# Patient Record
Sex: FEMALE | Race: OTHER | Employment: UNEMPLOYED | URBAN - METROPOLITAN AREA
[De-identification: names, ages, dates, MRNs, and addresses within clinical notes are randomized per-mention and may not be internally consistent; named-entity substitution may affect disease eponyms.]

---

## 2020-05-13 ENCOUNTER — TELEPHONE (OUTPATIENT)
Dept: OBGYN CLINIC | Facility: CLINIC | Age: 30
End: 2020-05-13

## 2020-05-13 ENCOUNTER — TELEMEDICINE (OUTPATIENT)
Dept: FAMILY MEDICINE CLINIC | Facility: CLINIC | Age: 30
End: 2020-05-13

## 2020-05-13 DIAGNOSIS — Z32.01 PREGNANCY TEST POSITIVE: Primary | ICD-10-CM

## 2020-05-13 PROCEDURE — 99203 OFFICE O/P NEW LOW 30 MIN: CPT | Performed by: FAMILY MEDICINE

## 2020-05-13 NOTE — TELEPHONE ENCOUNTER
Patient calling to initiate prenatal care  LMP 4/13/20   Patient is 7-8 weeks Confirmation Ultrasound and Appointment scheduled on 6 /10 20  [facility]   Good time to return phone call  [phone]    This will be the 3rd child.  So far

## 2020-05-13 NOTE — H&P
[facility], [address] [place] [address]    History and Physical    [facility] [place] Status:  No patient class for patient encounter    3/22/1990 MRN [id number]   Location [facility], [facility] , [facility] Attending No att.  pr taken for this visit. Constitutional: No distress. She is alert, coherent and comfortable over the phone and was able to speak in full sentences with ease.        Results:   No results found for: WBC, HGB, HCT, PLT, CREATSERUM, BUN, NA, K, CL, CO2, GL

## 2020-05-13 NOTE — TELEPHONE ENCOUNTER
Future Appointments   Date Time Provider Michele Gr   7/8/2020  9:30 AM Zane Moreno MD [facility] [address]     PT was [facility] due to [facility] INS  New OB appt scheduled  LMP 4/13/20

## 2020-05-13 NOTE — TELEPHONE ENCOUNTER
[facility] community will not cover confirmation US if pt is sure of lmp. Please reschedule for new ob and route to RN.

## 2020-05-13 NOTE — TELEPHONE ENCOUNTER
Meds: PNV  PMH: None  PSH: C/S x 2  Complications?: None  Patient states her menses are regular. Denies any bleeding or pain at this time. Advised patient to keep appointment as scheduled. SAB precautions given.

## 2020-05-13 NOTE — PATIENT INSTRUCTIONS
Thank you for choosing Vargas Tam MD at [place] [address]  To Do: Stephanie Preston  1. Please take prenatal vitamins  [facility] is located in [address]. Monday, Tuesday & Friday – 8 a.m. to 4 p.m. Wednesday, Thursday – 7 a.m. to 3 p.m. those potential risks and we strive to make you healthier and to improve your quality of life.     Referrals, and Radiology Information:    If your insurance requires a referral to a specialist, please allow 5 business days to process your referral request.

## 2020-06-24 ENCOUNTER — TELEMEDICINE (OUTPATIENT)
Dept: FAMILY MEDICINE CLINIC | Facility: CLINIC | Age: 30
End: 2020-06-24

## 2020-06-24 ENCOUNTER — E-VISIT (OUTPATIENT)
Dept: FAMILY MEDICINE CLINIC | Facility: CLINIC | Age: 30
End: 2020-06-24

## 2020-06-24 DIAGNOSIS — Z02.9 ENCOUNTERS FOR ADMINISTRATIVE PURPOSE: Primary | ICD-10-CM

## 2020-06-24 DIAGNOSIS — N30.00 ACUTE CYSTITIS WITHOUT HEMATURIA: Primary | ICD-10-CM

## 2020-06-24 PROCEDURE — 99213 OFFICE O/P EST LOW 20 MIN: CPT | Performed by: FAMILY MEDICINE

## 2020-06-24 RX ORDER — CEPHALEXIN 500 MG/1
500 CAPSULE ORAL 3 TIMES DAILY
Qty: 21 CAPSULE | Refills: 0 | Status: SHIPPED | OUTPATIENT
Start: 2020-06-24 | End: 2020-07-01

## 2020-06-24 NOTE — PROGRESS NOTES
PT created e-visit today but has telemedicine visit with PCP today at 11AM. Pt created e-visit in error. Requested cancellation. No charge to patient.      Spoke to patient on phone, Will keep appointment with PCP as they may want further laboratory testing

## 2020-06-24 NOTE — PATIENT INSTRUCTIONS
Thank you for choosing Marisol Bowen MD at [place] [address]  To Do: Stephanie Preston  1. Please take meds as directed. [facility] Bea[facility] is located in [address]. Monday, Tuesday & Friday – 8 a.m. to 4 p.m. Wednesday, Thursday – 7 a.m. to 3 p.m. those potential risks and we strive to make you healthier and to improve your quality of life.     Referrals, and Radiology Information:    If your insurance requires a referral to a specialist, please allow 5 business days to process your referral request.

## 2020-06-24 NOTE — PROGRESS NOTES
HPI:    Patient ID: Deysi Garcia is a 31year old female. HPI  Ms. Tito Emanuel is a pleasant 32 y/o F who has been generally healthy and is currently pregnant with her 3rd child at 1 mos AOG presenting for a video visit.  She has been having urinary frequ mouth 3 (three) times daily for 7 days.        Imaging & Referrals:  None       TY#[id number]

## 2020-07-23 ENCOUNTER — INITIAL PRENATAL (OUTPATIENT)
Dept: OBGYN CLINIC | Facility: CLINIC | Age: 30
End: 2020-07-23
Payer: MEDICAID

## 2020-07-23 VITALS — DIASTOLIC BLOOD PRESSURE: 70 MMHG | WEIGHT: 227.38 LBS | SYSTOLIC BLOOD PRESSURE: 116 MMHG

## 2020-07-23 DIAGNOSIS — Z36.9 PRENATAL SCREENING ENCOUNTER: Primary | ICD-10-CM

## 2020-07-23 DIAGNOSIS — Z34.81 ENCOUNTER FOR SUPERVISION OF OTHER NORMAL PREGNANCY IN FIRST TRIMESTER: ICD-10-CM

## 2020-07-23 LAB — MULTISTIX LOT#: NORMAL NUMERIC

## 2020-07-23 PROCEDURE — 87086 URINE CULTURE/COLONY COUNT: CPT | Performed by: NURSE PRACTITIONER

## 2020-07-23 PROCEDURE — 3078F DIAST BP <80 MM HG: CPT | Performed by: NURSE PRACTITIONER

## 2020-07-23 PROCEDURE — 81002 URINALYSIS NONAUTO W/O SCOPE: CPT | Performed by: NURSE PRACTITIONER

## 2020-07-23 PROCEDURE — 3074F SYST BP LT 130 MM HG: CPT | Performed by: NURSE PRACTITIONER

## 2020-07-23 PROCEDURE — 0500F INITIAL PRENATAL CARE VISIT: CPT | Performed by: NURSE PRACTITIONER

## 2020-07-23 NOTE — PROGRESS NOTES
Here for initial prenatal visit with our group. 31year old  at 14w3d by LMP. Patient's last menstrual period was 2020 (exact date). Last pap smear was in 2018 and it was normal. She will obtain the records.      Her first pregnancy result

## 2020-08-07 ENCOUNTER — APPOINTMENT (OUTPATIENT)
Dept: LAB | Age: 30
End: 2020-08-07
Attending: NURSE PRACTITIONER
Payer: MEDICAID

## 2020-08-07 DIAGNOSIS — Z34.81 PRENATAL CARE, SUBSEQUENT PREGNANCY, FIRST TRIMESTER: Primary | ICD-10-CM

## 2020-08-07 DIAGNOSIS — Z34.81 ENCOUNTER FOR SUPERVISION OF OTHER NORMAL PREGNANCY IN FIRST TRIMESTER: ICD-10-CM

## 2020-08-07 LAB
ANTIBODY SCREEN: NEGATIVE
BASOPHILS # BLD AUTO: 0.01 X10(3) UL (ref 0–0.2)
BASOPHILS NFR BLD AUTO: 0.1 %
DEPRECATED RDW RBC AUTO: 41 FL (ref 35.1–46.3)
EOSINOPHIL # BLD AUTO: 0.08 X10(3) UL (ref 0–0.7)
EOSINOPHIL NFR BLD AUTO: 0.9 %
ERYTHROCYTE [DISTWIDTH] IN BLOOD BY AUTOMATED COUNT: 12.6 % (ref 11–15)
HBV SURFACE AG SER-ACNC: <0.1 [IU]/L
HBV SURFACE AG SERPL QL IA: NONREACTIVE
HCT VFR BLD AUTO: 36.6 % (ref 35–48)
HGB BLD-MCNC: 12.4 G/DL (ref 12–16)
IMM GRANULOCYTES # BLD AUTO: 0.02 X10(3) UL (ref 0–1)
IMM GRANULOCYTES NFR BLD: 0.2 %
LYMPHOCYTES # BLD AUTO: 1.93 X10(3) UL (ref 1–4)
LYMPHOCYTES NFR BLD AUTO: 21.2 %
MCH RBC QN AUTO: 30 PG (ref 26–34)
MCHC RBC AUTO-ENTMCNC: 33.9 G/DL (ref 31–37)
MCV RBC AUTO: 88.6 FL (ref 80–100)
MONOCYTES # BLD AUTO: 0.29 X10(3) UL (ref 0.1–1)
MONOCYTES NFR BLD AUTO: 3.2 %
NEUTROPHILS # BLD AUTO: 6.77 X10 (3) UL (ref 1.5–7.7)
NEUTROPHILS # BLD AUTO: 6.77 X10(3) UL (ref 1.5–7.7)
NEUTROPHILS NFR BLD AUTO: 74.4 %
PLATELET # BLD AUTO: 200 10(3)UL (ref 150–450)
RBC # BLD AUTO: 4.13 X10(6)UL (ref 3.8–5.3)
RH BLOOD TYPE: POSITIVE
RUBV IGG SER QL: POSITIVE
RUBV IGG SER-ACNC: >500 IU/ML (ref 10–?)
T PALLIDUM AB SER QL IA: NONREACTIVE
WBC # BLD AUTO: 9.1 X10(3) UL (ref 4–11)

## 2020-08-07 PROCEDURE — 86780 TREPONEMA PALLIDUM: CPT

## 2020-08-07 PROCEDURE — 85025 COMPLETE CBC W/AUTO DIFF WBC: CPT

## 2020-08-07 PROCEDURE — 86901 BLOOD TYPING SEROLOGIC RH(D): CPT

## 2020-08-07 PROCEDURE — 86900 BLOOD TYPING SEROLOGIC ABO: CPT

## 2020-08-07 PROCEDURE — 87340 HEPATITIS B SURFACE AG IA: CPT

## 2020-08-07 PROCEDURE — 36415 COLL VENOUS BLD VENIPUNCTURE: CPT

## 2020-08-07 PROCEDURE — 87389 HIV-1 AG W/HIV-1&-2 AB AG IA: CPT

## 2020-08-07 PROCEDURE — 86850 RBC ANTIBODY SCREEN: CPT

## 2020-08-07 PROCEDURE — 86762 RUBELLA ANTIBODY: CPT

## 2020-08-10 ENCOUNTER — TELEPHONE (OUTPATIENT)
Dept: OBGYN CLINIC | Facility: CLINIC | Age: 30
End: 2020-08-10

## 2020-08-10 NOTE — TELEPHONE ENCOUNTER
Normal MaterniT 21 results, FYI male fetus noted. Results will be given to RN in office to notify patient.

## 2020-08-11 NOTE — TELEPHONE ENCOUNTER
Patient returned call. Verified correct name and .  Reported results including sex of the baby per pt request.

## 2020-08-21 ENCOUNTER — ROUTINE PRENATAL (OUTPATIENT)
Dept: OBGYN CLINIC | Facility: CLINIC | Age: 30
End: 2020-08-21
Payer: MEDICAID

## 2020-08-21 VITALS — SYSTOLIC BLOOD PRESSURE: 114 MMHG | DIASTOLIC BLOOD PRESSURE: 72 MMHG | WEIGHT: 230 LBS

## 2020-08-21 DIAGNOSIS — Z34.81 ENCOUNTER FOR SUPERVISION OF OTHER NORMAL PREGNANCY IN FIRST TRIMESTER: ICD-10-CM

## 2020-08-21 DIAGNOSIS — Z36.9 PRENATAL SCREENING ENCOUNTER: Primary | ICD-10-CM

## 2020-08-21 LAB — MULTISTIX LOT#: NORMAL NUMERIC

## 2020-08-21 PROCEDURE — 0502F SUBSEQUENT PRENATAL CARE: CPT | Performed by: OBSTETRICS & GYNECOLOGY

## 2020-08-21 PROCEDURE — 81002 URINALYSIS NONAUTO W/O SCOPE: CPT | Performed by: OBSTETRICS & GYNECOLOGY

## 2020-08-21 PROCEDURE — 3078F DIAST BP <80 MM HG: CPT | Performed by: OBSTETRICS & GYNECOLOGY

## 2020-08-21 PROCEDURE — 3074F SYST BP LT 130 MM HG: CPT | Performed by: OBSTETRICS & GYNECOLOGY

## 2020-09-14 ENCOUNTER — MED REC SCAN ONLY (OUTPATIENT)
Dept: OBGYN CLINIC | Facility: CLINIC | Age: 30
End: 2020-09-14

## 2020-09-14 PROBLEM — Z98.891 PREVIOUS CESAREAN SECTION: Status: ACTIVE | Noted: 2020-09-14

## 2020-09-15 ENCOUNTER — ROUTINE PRENATAL (OUTPATIENT)
Dept: OBGYN CLINIC | Facility: CLINIC | Age: 30
End: 2020-09-15
Payer: MEDICAID

## 2020-09-15 ENCOUNTER — ULTRASOUND ENCOUNTER (OUTPATIENT)
Dept: OBGYN CLINIC | Facility: CLINIC | Age: 30
End: 2020-09-15
Payer: MEDICAID

## 2020-09-15 VITALS — WEIGHT: 232 LBS | SYSTOLIC BLOOD PRESSURE: 118 MMHG | DIASTOLIC BLOOD PRESSURE: 76 MMHG

## 2020-09-15 DIAGNOSIS — Z36.9 PRENATAL SCREENING ENCOUNTER: Primary | ICD-10-CM

## 2020-09-15 DIAGNOSIS — Z36.89 SCREENING, ANTENATAL, FOR FETAL ANATOMIC SURVEY: ICD-10-CM

## 2020-09-15 PROBLEM — Z3A.20 20 WEEKS GESTATION OF PREGNANCY (HCC): Status: ACTIVE | Noted: 2020-09-15

## 2020-09-15 PROBLEM — Z3A.20 20 WEEKS GESTATION OF PREGNANCY: Status: ACTIVE | Noted: 2020-09-15

## 2020-09-15 LAB
GLUCOSE (URINE DIPSTICK): NEGATIVE MG/DL
MULTISTIX LOT#: NORMAL NUMERIC

## 2020-09-15 PROCEDURE — 3074F SYST BP LT 130 MM HG: CPT | Performed by: OBSTETRICS & GYNECOLOGY

## 2020-09-15 PROCEDURE — 76805 OB US >/= 14 WKS SNGL FETUS: CPT | Performed by: OBSTETRICS & GYNECOLOGY

## 2020-09-15 PROCEDURE — 0502F SUBSEQUENT PRENATAL CARE: CPT | Performed by: OBSTETRICS & GYNECOLOGY

## 2020-09-15 PROCEDURE — 3078F DIAST BP <80 MM HG: CPT | Performed by: OBSTETRICS & GYNECOLOGY

## 2020-09-15 PROCEDURE — 81002 URINALYSIS NONAUTO W/O SCOPE: CPT | Performed by: OBSTETRICS & GYNECOLOGY

## 2020-09-15 NOTE — PROGRESS NOTES
Patient presents with:  Pregnancy: KISHAN after US     Routine prenatal visit. Patient without complaints. Patient denies any bleeding, leaking fluid, cramping, or contractions. Good fetal movement.     Assessment/Plan:  22w1d doing well  Ultrasound EDC

## 2020-09-16 ENCOUNTER — TELEPHONE (OUTPATIENT)
Dept: OBGYN CLINIC | Facility: CLINIC | Age: 30
End: 2020-09-16

## 2020-10-16 ENCOUNTER — ROUTINE PRENATAL (OUTPATIENT)
Dept: OBGYN CLINIC | Facility: CLINIC | Age: 30
End: 2020-10-16
Payer: MEDICAID

## 2020-10-16 VITALS
DIASTOLIC BLOOD PRESSURE: 62 MMHG | HEIGHT: 65 IN | WEIGHT: 233.38 LBS | SYSTOLIC BLOOD PRESSURE: 102 MMHG | BODY MASS INDEX: 38.88 KG/M2

## 2020-10-16 DIAGNOSIS — Z98.891 PREVIOUS CESAREAN SECTION: ICD-10-CM

## 2020-10-16 DIAGNOSIS — Z36.9 PRENATAL SCREENING ENCOUNTER: Primary | ICD-10-CM

## 2020-10-16 DIAGNOSIS — Z34.81 ENCOUNTER FOR SUPERVISION OF OTHER NORMAL PREGNANCY IN FIRST TRIMESTER: ICD-10-CM

## 2020-10-16 PROCEDURE — 90715 TDAP VACCINE 7 YRS/> IM: CPT | Performed by: OBSTETRICS & GYNECOLOGY

## 2020-10-16 PROCEDURE — 3074F SYST BP LT 130 MM HG: CPT | Performed by: OBSTETRICS & GYNECOLOGY

## 2020-10-16 PROCEDURE — 81002 URINALYSIS NONAUTO W/O SCOPE: CPT | Performed by: OBSTETRICS & GYNECOLOGY

## 2020-10-16 PROCEDURE — 90471 IMMUNIZATION ADMIN: CPT | Performed by: OBSTETRICS & GYNECOLOGY

## 2020-10-16 PROCEDURE — 3008F BODY MASS INDEX DOCD: CPT | Performed by: OBSTETRICS & GYNECOLOGY

## 2020-10-16 PROCEDURE — 3078F DIAST BP <80 MM HG: CPT | Performed by: OBSTETRICS & GYNECOLOGY

## 2020-10-16 PROCEDURE — 0502F SUBSEQUENT PRENATAL CARE: CPT | Performed by: OBSTETRICS & GYNECOLOGY

## 2020-10-16 NOTE — PROGRESS NOTES
KISHAN 28w5d    Doing well, +FM  No contractions  No LOF, VB  Forgot about her 1 hour    1. FHT-present  2. PNL:  Stressed importance of 1 hour/CBC prior to next visit. HIV order added and discussed  3.  Mode of delivery: RCS needed at 39 weeks, message sent t

## 2020-10-16 NOTE — PATIENT INSTRUCTIONS
Please call [phone] to set up an appointment for your laboratory draw. Tdap Vaccine: What You Need To Know    1. Why Get Vaccinated:    · Tetanus, diphtheria, and pertussis can be very serious diseases, even for adolescents and adults.   Tdap vaccine gotten a dose. Tdap may also be given after a severe cut or burn to prevent tetanus infection.

## 2020-10-20 ENCOUNTER — TELEPHONE (OUTPATIENT)
Dept: OBGYN CLINIC | Facility: CLINIC | Age: 30
End: 2020-10-20

## 2020-10-20 NOTE — TELEPHONE ENCOUNTER
----- Message from Kenn Le MD sent at 10/16/2020  3:06 PM CDT -----  Regarding: needs RCS scheudled  Needs RCS scheduled for 39 weeks  HENRY is 1/3/21  Schedule per call schedule  Let pt  know date/time when done, thank you! Thank you!

## 2020-10-21 ENCOUNTER — TELEPHONE (OUTPATIENT)
Dept: OBGYN CLINIC | Facility: CLINIC | Age: 30
End: 2020-10-21

## 2020-10-21 NOTE — TELEPHONE ENCOUNTER
Patient is overdue for 1 hour gtt. Next appt is scheduled for 10/30/20 with Dr. Sherie Sandhu for kailyn. No pending lab appts. Call to patient; no answer. Left message to call back.

## 2020-10-22 NOTE — TELEPHONE ENCOUNTER
Spoke with patient; she was scheduled for tomorrow but had to cancel due to childcare issues. Pt will reschedule for next week.

## 2020-10-27 ENCOUNTER — LAB ENCOUNTER (OUTPATIENT)
Dept: LAB | Age: 30
End: 2020-10-27
Attending: OBSTETRICS & GYNECOLOGY
Payer: MEDICAID

## 2020-10-27 DIAGNOSIS — Z36.9 PRENATAL SCREENING ENCOUNTER: ICD-10-CM

## 2020-10-27 DIAGNOSIS — Z34.81 ENCOUNTER FOR SUPERVISION OF OTHER NORMAL PREGNANCY IN FIRST TRIMESTER: ICD-10-CM

## 2020-10-27 PROCEDURE — 87389 HIV-1 AG W/HIV-1&-2 AB AG IA: CPT

## 2020-10-27 PROCEDURE — 85025 COMPLETE CBC W/AUTO DIFF WBC: CPT

## 2020-10-27 PROCEDURE — 82950 GLUCOSE TEST: CPT

## 2020-10-27 PROCEDURE — 36415 COLL VENOUS BLD VENIPUNCTURE: CPT

## 2020-10-27 PROCEDURE — 86780 TREPONEMA PALLIDUM: CPT

## 2020-10-30 ENCOUNTER — ROUTINE PRENATAL (OUTPATIENT)
Dept: OBGYN CLINIC | Facility: CLINIC | Age: 30
End: 2020-10-30
Payer: MEDICAID

## 2020-10-30 VITALS — DIASTOLIC BLOOD PRESSURE: 72 MMHG | WEIGHT: 232 LBS | SYSTOLIC BLOOD PRESSURE: 114 MMHG | BODY MASS INDEX: 39 KG/M2

## 2020-10-30 DIAGNOSIS — Z36.9 PRENATAL SCREENING ENCOUNTER: Primary | ICD-10-CM

## 2020-10-30 PROCEDURE — 3074F SYST BP LT 130 MM HG: CPT | Performed by: OBSTETRICS & GYNECOLOGY

## 2020-10-30 PROCEDURE — 0502F SUBSEQUENT PRENATAL CARE: CPT | Performed by: OBSTETRICS & GYNECOLOGY

## 2020-10-30 PROCEDURE — 81002 URINALYSIS NONAUTO W/O SCOPE: CPT | Performed by: OBSTETRICS & GYNECOLOGY

## 2020-10-30 PROCEDURE — 3078F DIAST BP <80 MM HG: CPT | Performed by: OBSTETRICS & GYNECOLOGY

## 2020-10-30 NOTE — PROGRESS NOTES
KISHAN  [id number]  GA: 30w5d   10/30/20  1451   BP: 114/72   Weight: 232 lb (105.2 kg)       Doing well, +FM  Denies LOF/VB/uctx  Rh positive, TDAP received, EPDS - will need at next visit   Fetal movement count given  Hx of CS x 2, ARLEEN scheudled 12/29/2020.  D/

## 2020-11-13 ENCOUNTER — ROUTINE PRENATAL (OUTPATIENT)
Dept: OBGYN CLINIC | Facility: CLINIC | Age: 30
End: 2020-11-13
Payer: MEDICAID

## 2020-11-13 VITALS — HEIGHT: 65 IN | WEIGHT: 235.38 LBS | BODY MASS INDEX: 39.22 KG/M2

## 2020-11-13 DIAGNOSIS — Z98.891 PREVIOUS CESAREAN SECTION: Primary | ICD-10-CM

## 2020-11-13 DIAGNOSIS — Z3A.20 20 WEEKS GESTATION OF PREGNANCY: ICD-10-CM

## 2020-11-13 DIAGNOSIS — Z36.9 PRENATAL SCREENING ENCOUNTER: ICD-10-CM

## 2020-11-13 PROCEDURE — 0502F SUBSEQUENT PRENATAL CARE: CPT | Performed by: OBSTETRICS & GYNECOLOGY

## 2020-11-13 PROCEDURE — 81002 URINALYSIS NONAUTO W/O SCOPE: CPT | Performed by: OBSTETRICS & GYNECOLOGY

## 2020-11-13 PROCEDURE — 3008F BODY MASS INDEX DOCD: CPT | Performed by: OBSTETRICS & GYNECOLOGY

## 2020-11-13 NOTE — PROGRESS NOTES
KISHAN - 32w5d  Doing well, +FM  Denies LOF/VB/uctx  Ht 65\"   Wt 235 lb 6.4 oz (106.8 kg)   LMP 04/13/2020 (Exact Date)   BMI 39.17 kg/m²   Rh positive, TDAP received  RTC in 2 wks  rCS with BS scheduled for 39 wks   PTL and Fetal movement instructions revie

## 2020-11-19 DIAGNOSIS — Z34.90 PREGNANCY: Primary | ICD-10-CM

## 2020-12-04 ENCOUNTER — ROUTINE PRENATAL (OUTPATIENT)
Dept: OBGYN CLINIC | Facility: CLINIC | Age: 30
End: 2020-12-04
Payer: MEDICAID

## 2020-12-04 VITALS — SYSTOLIC BLOOD PRESSURE: 110 MMHG | DIASTOLIC BLOOD PRESSURE: 64 MMHG | WEIGHT: 235.63 LBS | BODY MASS INDEX: 39 KG/M2

## 2020-12-04 DIAGNOSIS — Z36.9 PRENATAL SCREENING ENCOUNTER: ICD-10-CM

## 2020-12-04 DIAGNOSIS — Z3A.20 20 WEEKS GESTATION OF PREGNANCY: Primary | ICD-10-CM

## 2020-12-04 DIAGNOSIS — Z98.891 PREVIOUS CESAREAN SECTION: ICD-10-CM

## 2020-12-04 PROCEDURE — 87081 CULTURE SCREEN ONLY: CPT | Performed by: OBSTETRICS & GYNECOLOGY

## 2020-12-04 PROCEDURE — 81002 URINALYSIS NONAUTO W/O SCOPE: CPT | Performed by: OBSTETRICS & GYNECOLOGY

## 2020-12-04 PROCEDURE — 87653 STREP B DNA AMP PROBE: CPT | Performed by: OBSTETRICS & GYNECOLOGY

## 2020-12-04 PROCEDURE — 0502F SUBSEQUENT PRENATAL CARE: CPT | Performed by: OBSTETRICS & GYNECOLOGY

## 2020-12-04 PROCEDURE — 3078F DIAST BP <80 MM HG: CPT | Performed by: OBSTETRICS & GYNECOLOGY

## 2020-12-04 PROCEDURE — 3074F SYST BP LT 130 MM HG: CPT | Performed by: OBSTETRICS & GYNECOLOGY

## 2020-12-04 NOTE — PATIENT INSTRUCTIONS
Labor Instructions    How do I know if it’s true labor? • One of the most important aspects of any pregnancy is being able to recognize the onset of labor.   Unfortunately, on occasion it can be difficult or confusing, especially if you have had one or m timing of the contractions. Having regular (usually closer), longer lasting (35-70 seconds), and sharper (more painful) contractions are the common symptoms of actual labor.   The second way in which labor can begin which occurs in approximately 30% of all allowed in the room during your labor. During the delivery, the nurses will inform you of the hospital policy and how many coaches are allowed. You may desire pain medication or anesthesia for pain.   You probably discussed some aspects of pain medication excessively. Please call the office within a few days after you are discharged from the hospital to schedule your post-partum visit, which is usually 4-6 weeks after delivery. Any medications necessary will be discussed on an individual basis.   If you Time M T W Th F S S                                                                                                           Time M T W Th F S S

## 2020-12-04 NOTE — PROGRESS NOTES
KISHAN  [id number]  GA: 35w5d   12/04/20  1445   BP: 110/64   Weight: 235 lb 9.6 oz (106.9 kg)       Doing well, +FM  Denies LOF/VB/uctx  Mode of delivery: RCS anticipated  SVE 0/0/-4   GBS collected  Fetal movement count given  Labor precautions provided   Hx o

## 2020-12-11 ENCOUNTER — ROUTINE PRENATAL (OUTPATIENT)
Dept: OBGYN CLINIC | Facility: CLINIC | Age: 30
End: 2020-12-11
Payer: MEDICAID

## 2020-12-11 VITALS
DIASTOLIC BLOOD PRESSURE: 68 MMHG | WEIGHT: 235.81 LBS | HEIGHT: 65 IN | HEART RATE: 88 BPM | BODY MASS INDEX: 39.29 KG/M2 | SYSTOLIC BLOOD PRESSURE: 118 MMHG

## 2020-12-11 DIAGNOSIS — Z98.891 PREVIOUS CESAREAN SECTION: ICD-10-CM

## 2020-12-11 DIAGNOSIS — Z36.9 PRENATAL SCREENING ENCOUNTER: Primary | ICD-10-CM

## 2020-12-11 DIAGNOSIS — Z34.83 ENCOUNTER FOR SUPERVISION OF OTHER NORMAL PREGNANCY IN THIRD TRIMESTER: ICD-10-CM

## 2020-12-11 PROCEDURE — 3008F BODY MASS INDEX DOCD: CPT | Performed by: NURSE PRACTITIONER

## 2020-12-11 PROCEDURE — 3074F SYST BP LT 130 MM HG: CPT | Performed by: NURSE PRACTITIONER

## 2020-12-11 PROCEDURE — 0502F SUBSEQUENT PRENATAL CARE: CPT | Performed by: NURSE PRACTITIONER

## 2020-12-11 PROCEDURE — 81002 URINALYSIS NONAUTO W/O SCOPE: CPT | Performed by: NURSE PRACTITIONER

## 2020-12-11 PROCEDURE — 3078F DIAST BP <80 MM HG: CPT | Performed by: NURSE PRACTITIONER

## 2020-12-11 NOTE — PROGRESS NOTES
KISHAN  Doing well, +FM  Denies VB/LOF/uctx  Mode of delivery: RCS and BS scheduled  Labor precautions discussed  GBS positive  RTC 1 week

## 2020-12-16 ENCOUNTER — ROUTINE PRENATAL (OUTPATIENT)
Dept: OBGYN CLINIC | Facility: CLINIC | Age: 30
End: 2020-12-16
Payer: MEDICAID

## 2020-12-16 VITALS — WEIGHT: 235.81 LBS | BODY MASS INDEX: 39 KG/M2 | SYSTOLIC BLOOD PRESSURE: 116 MMHG | DIASTOLIC BLOOD PRESSURE: 78 MMHG

## 2020-12-16 DIAGNOSIS — Z3A.20 20 WEEKS GESTATION OF PREGNANCY: ICD-10-CM

## 2020-12-16 DIAGNOSIS — Z98.891 PREVIOUS CESAREAN SECTION: ICD-10-CM

## 2020-12-16 DIAGNOSIS — Z34.00 SUPERVISION OF NORMAL FIRST PREGNANCY, ANTEPARTUM: Primary | ICD-10-CM

## 2020-12-16 PROCEDURE — 0502F SUBSEQUENT PRENATAL CARE: CPT | Performed by: OBSTETRICS & GYNECOLOGY

## 2020-12-16 PROCEDURE — 3074F SYST BP LT 130 MM HG: CPT | Performed by: OBSTETRICS & GYNECOLOGY

## 2020-12-16 PROCEDURE — 81002 URINALYSIS NONAUTO W/O SCOPE: CPT | Performed by: OBSTETRICS & GYNECOLOGY

## 2020-12-16 PROCEDURE — 3078F DIAST BP <80 MM HG: CPT | Performed by: OBSTETRICS & GYNECOLOGY

## 2020-12-16 NOTE — PATIENT INSTRUCTIONS
FETAL MOVEMENT CHART    Begin counting fetal movements at 32 weeks of pregnancy. You may find that your baby is more active after eating or drinking. We want you to time how long it takes to feel 10 movements (kicks, flutters, swishes or rolls).   Ricki Leo

## 2020-12-16 NOTE — PROGRESS NOTES
KISHAN 37w3d    Doing well, +FM  No contractions  No LOF, VB  Feeling pelvic pressure    1. FHT-present  2. PNL:  GBS positive  3. Mode of delivery: for RCS at 39 weeks, scheduled, with tubal ligation. IDPA form has been signed. Given ERAS handout today  4.  I

## 2020-12-21 ENCOUNTER — TELEPHONE (OUTPATIENT)
Dept: OBGYN CLINIC | Facility: CLINIC | Age: 30
End: 2020-12-21

## 2020-12-21 NOTE — TELEPHONE ENCOUNTER
Pt requesting order for breast pump. I advised pt in another call to set up acct with Eric Long (she is medicaid) but [facility] will not set up acct w/o an order from doctor.

## 2020-12-23 ENCOUNTER — TELEPHONE (OUTPATIENT)
Dept: OBGYN UNIT | Facility: HOSPITAL | Age: 30
End: 2020-12-23

## 2020-12-23 ENCOUNTER — ROUTINE PRENATAL (OUTPATIENT)
Dept: OBGYN CLINIC | Facility: CLINIC | Age: 30
End: 2020-12-23
Payer: MEDICAID

## 2020-12-23 VITALS
DIASTOLIC BLOOD PRESSURE: 68 MMHG | BODY MASS INDEX: 39.42 KG/M2 | WEIGHT: 236.63 LBS | HEIGHT: 65 IN | SYSTOLIC BLOOD PRESSURE: 114 MMHG

## 2020-12-23 DIAGNOSIS — Z98.891 PREVIOUS CESAREAN SECTION: ICD-10-CM

## 2020-12-23 DIAGNOSIS — O99.820 GBS (GROUP B STREPTOCOCCUS CARRIER), +RV CULTURE, CURRENTLY PREGNANT: ICD-10-CM

## 2020-12-23 DIAGNOSIS — Z34.80 SUPERVISION OF OTHER NORMAL PREGNANCY: Primary | ICD-10-CM

## 2020-12-23 PROCEDURE — 0502F SUBSEQUENT PRENATAL CARE: CPT | Performed by: OBSTETRICS & GYNECOLOGY

## 2020-12-23 PROCEDURE — 3078F DIAST BP <80 MM HG: CPT | Performed by: OBSTETRICS & GYNECOLOGY

## 2020-12-23 PROCEDURE — 3008F BODY MASS INDEX DOCD: CPT | Performed by: OBSTETRICS & GYNECOLOGY

## 2020-12-23 PROCEDURE — 3074F SYST BP LT 130 MM HG: CPT | Performed by: OBSTETRICS & GYNECOLOGY

## 2020-12-23 PROCEDURE — 81002 URINALYSIS NONAUTO W/O SCOPE: CPT | Performed by: OBSTETRICS & GYNECOLOGY

## 2020-12-23 NOTE — PATIENT INSTRUCTIONS
Labor Instructions    How do I know if it’s true labor? • One of the most important aspects of any pregnancy is being able to recognize the onset of labor.   Unfortunately, on occasion it can be difficult or confusing, especially if you have had one or mor of the contractions. Having regular (usually closer), longer lasting (35-70 seconds), and sharper (more painful) contractions are the common symptoms of actual labor.   The second way in which labor can begin which occurs in approximately 30% of all patien the room during your labor. During the delivery, the nurses will inform you of the hospital policy and how many coaches are allowed. You may desire pain medication or anesthesia for pain.   You probably discussed some aspects of pain medication with us dur Please call the office within a few days after you are discharged from the hospital to schedule your post-partum visit, which is usually 4-6 weeks after delivery. Any medications necessary will be discussed on an individual basis.   If you decide to maurice Time M T W Th F S S                                                                                                           Time M T W Th F S S

## 2020-12-23 NOTE — PROGRESS NOTES
KISHAN  [id number]  GA: 38w3d  There were no vitals filed for this visit.     Doing well, +FM   Denies LOF/VB/uctx  Mode of delivery: ARLEEN anticipated  SVE 0/0/-3   GBS positive  Fetal movement count given  Labor precautions provided   Hx of CS x 2, RCS w/ BS sche

## 2020-12-26 ENCOUNTER — LAB ENCOUNTER (OUTPATIENT)
Dept: LAB | Age: 30
End: 2020-12-26
Attending: OBSTETRICS & GYNECOLOGY
Payer: MEDICAID

## 2020-12-26 DIAGNOSIS — Z34.90 PREGNANCY: ICD-10-CM

## 2020-12-28 ENCOUNTER — TELEPHONE (OUTPATIENT)
Dept: OBGYN UNIT | Facility: HOSPITAL | Age: 30
End: 2020-12-28

## 2020-12-29 ENCOUNTER — HOSPITAL ENCOUNTER (INPATIENT)
Facility: HOSPITAL | Age: 30
LOS: 2 days | Discharge: HOME | End: 2020-12-31
Attending: OBSTETRICS & GYNECOLOGY | Admitting: OBSTETRICS & GYNECOLOGY
Payer: MEDICAID

## 2020-12-29 ENCOUNTER — ANESTHESIA (OUTPATIENT)
Dept: OBGYN UNIT | Facility: HOSPITAL | Age: 30
End: 2020-12-29
Payer: MEDICAID

## 2020-12-29 ENCOUNTER — ANESTHESIA EVENT (OUTPATIENT)
Dept: OBGYN UNIT | Facility: HOSPITAL | Age: 30
End: 2020-12-29
Payer: MEDICAID

## 2020-12-29 PROBLEM — Z34.90 PREGNANCY (HCC): Status: ACTIVE | Noted: 2020-12-29

## 2020-12-29 PROBLEM — Z34.90 PREGNANCY: Status: ACTIVE | Noted: 2020-12-29

## 2020-12-29 PROBLEM — Z34.80 SUPERVISION OF OTHER NORMAL PREGNANCY: Status: RESOLVED | Noted: 2020-12-23 | Resolved: 2020-12-29

## 2020-12-29 LAB
ANTIBODY SCREEN: NEGATIVE
BASOPHILS # BLD AUTO: 0.01 X10(3) UL (ref 0–0.2)
BASOPHILS NFR BLD AUTO: 0.1 %
DEPRECATED RDW RBC AUTO: 38.7 FL (ref 35.1–46.3)
EOSINOPHIL # BLD AUTO: 0.02 X10(3) UL (ref 0–0.7)
EOSINOPHIL NFR BLD AUTO: 0.3 %
ERYTHROCYTE [DISTWIDTH] IN BLOOD BY AUTOMATED COUNT: 13.5 % (ref 11–15)
HCT VFR BLD AUTO: 32.3 %
HGB BLD-MCNC: 10.8 G/DL
IMM GRANULOCYTES # BLD AUTO: 0.03 X10(3) UL (ref 0–1)
IMM GRANULOCYTES NFR BLD: 0.4 %
LYMPHOCYTES # BLD AUTO: 2.35 X10(3) UL (ref 1–4)
LYMPHOCYTES NFR BLD AUTO: 30.6 %
MCH RBC QN AUTO: 26.4 PG (ref 26–34)
MCHC RBC AUTO-ENTMCNC: 33.4 G/DL (ref 31–37)
MCV RBC AUTO: 79 FL
MONOCYTES # BLD AUTO: 0.41 X10(3) UL (ref 0.1–1)
MONOCYTES NFR BLD AUTO: 5.3 %
NEUTROPHILS # BLD AUTO: 4.87 X10 (3) UL (ref 1.5–7.7)
NEUTROPHILS # BLD AUTO: 4.87 X10(3) UL (ref 1.5–7.7)
NEUTROPHILS NFR BLD AUTO: 63.3 %
PLATELET # BLD AUTO: 225 10(3)UL (ref 150–450)
RBC # BLD AUTO: 4.09 X10(6)UL
RH BLOOD TYPE: POSITIVE
T PALLIDUM AB SER QL IA: NONREACTIVE
WBC # BLD AUTO: 7.7 X10(3) UL (ref 4–11)

## 2020-12-29 PROCEDURE — 59514 CESAREAN DELIVERY ONLY: CPT | Performed by: OBSTETRICS & GYNECOLOGY

## 2020-12-29 PROCEDURE — 0UB70ZZ EXCISION OF BILATERAL FALLOPIAN TUBES, OPEN APPROACH: ICD-10-PCS | Performed by: OBSTETRICS & GYNECOLOGY

## 2020-12-29 PROCEDURE — 58611 LIGATE OVIDUCT(S) ADD-ON: CPT | Performed by: OBSTETRICS & GYNECOLOGY

## 2020-12-29 RX ORDER — PHENYLEPHRINE HCL 10 MG/ML
VIAL (ML) INJECTION AS NEEDED
Status: DISCONTINUED | OUTPATIENT
Start: 2020-12-29 | End: 2020-12-29 | Stop reason: SURG

## 2020-12-29 RX ORDER — GABAPENTIN 300 MG/1
300 CAPSULE ORAL EVERY 8 HOURS PRN
Status: DISCONTINUED | OUTPATIENT
Start: 2020-12-29 | End: 2020-12-31

## 2020-12-29 RX ORDER — KETOROLAC TROMETHAMINE 30 MG/ML
INJECTION, SOLUTION INTRAMUSCULAR; INTRAVENOUS
Status: COMPLETED
Start: 2020-12-29 | End: 2020-12-29

## 2020-12-29 RX ORDER — ONDANSETRON 2 MG/ML
4 INJECTION INTRAMUSCULAR; INTRAVENOUS EVERY 6 HOURS PRN
Status: DISCONTINUED | OUTPATIENT
Start: 2020-12-29 | End: 2020-12-31

## 2020-12-29 RX ORDER — IBUPROFEN 600 MG/1
600 TABLET ORAL EVERY 6 HOURS
Status: DISCONTINUED | OUTPATIENT
Start: 2020-12-30 | End: 2020-12-31

## 2020-12-29 RX ORDER — HYDROMORPHONE HYDROCHLORIDE 1 MG/ML
0.4 INJECTION, SOLUTION INTRAMUSCULAR; INTRAVENOUS; SUBCUTANEOUS EVERY 2 HOUR PRN
Status: ACTIVE | OUTPATIENT
Start: 2020-12-29 | End: 2020-12-30

## 2020-12-29 RX ORDER — NALOXONE HYDROCHLORIDE 0.4 MG/ML
0.08 INJECTION, SOLUTION INTRAMUSCULAR; INTRAVENOUS; SUBCUTANEOUS
Status: ACTIVE | OUTPATIENT
Start: 2020-12-29 | End: 2020-12-30

## 2020-12-29 RX ORDER — DOCUSATE SODIUM 100 MG/1
100 CAPSULE, LIQUID FILLED ORAL
Status: DISCONTINUED | OUTPATIENT
Start: 2020-12-30 | End: 2020-12-31

## 2020-12-29 RX ORDER — SODIUM CHLORIDE, SODIUM LACTATE, POTASSIUM CHLORIDE, CALCIUM CHLORIDE 600; 310; 30; 20 MG/100ML; MG/100ML; MG/100ML; MG/100ML
125 INJECTION, SOLUTION INTRAVENOUS CONTINUOUS
Status: DISCONTINUED | OUTPATIENT
Start: 2020-12-29 | End: 2020-12-29 | Stop reason: HOSPADM

## 2020-12-29 RX ORDER — HYDROMORPHONE HYDROCHLORIDE 1 MG/ML
0.4 INJECTION, SOLUTION INTRAMUSCULAR; INTRAVENOUS; SUBCUTANEOUS EVERY 5 MIN PRN
Status: DISCONTINUED | OUTPATIENT
Start: 2020-12-29 | End: 2020-12-29 | Stop reason: HOSPADM

## 2020-12-29 RX ORDER — TRISODIUM CITRATE DIHYDRATE AND CITRIC ACID MONOHYDRATE 500; 334 MG/5ML; MG/5ML
30 SOLUTION ORAL ONCE
Status: DISCONTINUED | OUTPATIENT
Start: 2020-12-29 | End: 2020-12-29 | Stop reason: HOSPADM

## 2020-12-29 RX ORDER — BUPIVACAINE HYDROCHLORIDE 7.5 MG/ML
INJECTION, SOLUTION INTRASPINAL AS NEEDED
Status: DISCONTINUED | OUTPATIENT
Start: 2020-12-29 | End: 2020-12-29 | Stop reason: SURG

## 2020-12-29 RX ORDER — KETOROLAC TROMETHAMINE 30 MG/ML
30 INJECTION, SOLUTION INTRAMUSCULAR; INTRAVENOUS ONCE AS NEEDED
Status: COMPLETED | OUTPATIENT
Start: 2020-12-29 | End: 2020-12-29

## 2020-12-29 RX ORDER — KETOROLAC TROMETHAMINE 30 MG/ML
30 INJECTION, SOLUTION INTRAMUSCULAR; INTRAVENOUS EVERY 6 HOURS
Status: DISPENSED | OUTPATIENT
Start: 2020-12-29 | End: 2020-12-30

## 2020-12-29 RX ORDER — BISACODYL 10 MG
10 SUPPOSITORY, RECTAL RECTAL
Status: DISCONTINUED | OUTPATIENT
Start: 2020-12-29 | End: 2020-12-31

## 2020-12-29 RX ORDER — ZOLPIDEM TARTRATE 5 MG/1
5 TABLET ORAL NIGHTLY PRN
Status: DISCONTINUED | OUTPATIENT
Start: 2020-12-29 | End: 2020-12-31

## 2020-12-29 RX ORDER — ONDANSETRON 2 MG/ML
4 INJECTION INTRAMUSCULAR; INTRAVENOUS EVERY 6 HOURS PRN
Status: DISCONTINUED | OUTPATIENT
Start: 2020-12-29 | End: 2020-12-29 | Stop reason: HOSPADM

## 2020-12-29 RX ORDER — SIMETHICONE 80 MG
80 TABLET,CHEWABLE ORAL 3 TIMES DAILY PRN
Status: DISCONTINUED | OUTPATIENT
Start: 2020-12-29 | End: 2020-12-31

## 2020-12-29 RX ORDER — ONDANSETRON 2 MG/ML
INJECTION INTRAMUSCULAR; INTRAVENOUS AS NEEDED
Status: DISCONTINUED | OUTPATIENT
Start: 2020-12-29 | End: 2020-12-29 | Stop reason: SURG

## 2020-12-29 RX ORDER — SODIUM CHLORIDE, SODIUM LACTATE, POTASSIUM CHLORIDE, CALCIUM CHLORIDE 600; 310; 30; 20 MG/100ML; MG/100ML; MG/100ML; MG/100ML
INJECTION, SOLUTION INTRAVENOUS CONTINUOUS
Status: DISCONTINUED | OUTPATIENT
Start: 2020-12-29 | End: 2020-12-31

## 2020-12-29 RX ORDER — DEXTROSE, SODIUM CHLORIDE, SODIUM LACTATE, POTASSIUM CHLORIDE, AND CALCIUM CHLORIDE 5; .6; .31; .03; .02 G/100ML; G/100ML; G/100ML; G/100ML; G/100ML
INJECTION, SOLUTION INTRAVENOUS CONTINUOUS PRN
Status: DISCONTINUED | OUTPATIENT
Start: 2020-12-29 | End: 2020-12-31

## 2020-12-29 RX ORDER — ACETAMINOPHEN 500 MG
1000 TABLET ORAL ONCE
Status: COMPLETED | OUTPATIENT
Start: 2020-12-29 | End: 2020-12-29

## 2020-12-29 RX ORDER — ENOXAPARIN SODIUM 100 MG/ML
40 INJECTION SUBCUTANEOUS DAILY
Status: DISCONTINUED | OUTPATIENT
Start: 2020-12-29 | End: 2020-12-31

## 2020-12-29 RX ORDER — DIPHENHYDRAMINE HYDROCHLORIDE 50 MG/ML
25 INJECTION INTRAMUSCULAR; INTRAVENOUS ONCE AS NEEDED
Status: DISCONTINUED | OUTPATIENT
Start: 2020-12-29 | End: 2020-12-29 | Stop reason: HOSPADM

## 2020-12-29 RX ORDER — CEFAZOLIN SODIUM/WATER 2 G/20 ML
2 SYRINGE (ML) INTRAVENOUS ONCE
Status: COMPLETED | OUTPATIENT
Start: 2020-12-29 | End: 2020-12-29

## 2020-12-29 RX ORDER — DIPHENHYDRAMINE HYDROCHLORIDE 50 MG/ML
12.5 INJECTION INTRAMUSCULAR; INTRAVENOUS EVERY 4 HOURS PRN
Status: DISCONTINUED | OUTPATIENT
Start: 2020-12-29 | End: 2020-12-31

## 2020-12-29 RX ORDER — DEXAMETHASONE SODIUM PHOSPHATE 4 MG/ML
VIAL (ML) INJECTION AS NEEDED
Status: DISCONTINUED | OUTPATIENT
Start: 2020-12-29 | End: 2020-12-29 | Stop reason: SURG

## 2020-12-29 RX ORDER — MORPHINE SULFATE 2 MG/ML
INJECTION, SOLUTION INTRAMUSCULAR; INTRAVENOUS AS NEEDED
Status: DISCONTINUED | OUTPATIENT
Start: 2020-12-29 | End: 2020-12-29 | Stop reason: SURG

## 2020-12-29 RX ORDER — ACETAMINOPHEN 500 MG
1000 TABLET ORAL EVERY 6 HOURS
Status: DISCONTINUED | OUTPATIENT
Start: 2020-12-29 | End: 2020-12-31

## 2020-12-29 RX ORDER — ENOXAPARIN SODIUM 100 MG/ML
40 INJECTION SUBCUTANEOUS DAILY
Status: DISCONTINUED | OUTPATIENT
Start: 2020-12-29 | End: 2020-12-29

## 2020-12-29 RX ORDER — ONDANSETRON 2 MG/ML
4 INJECTION INTRAMUSCULAR; INTRAVENOUS ONCE AS NEEDED
Status: DISCONTINUED | OUTPATIENT
Start: 2020-12-29 | End: 2020-12-29 | Stop reason: HOSPADM

## 2020-12-29 RX ORDER — OXYCODONE HYDROCHLORIDE 5 MG/1
5 TABLET ORAL EVERY 6 HOURS PRN
Status: DISCONTINUED | OUTPATIENT
Start: 2020-12-29 | End: 2020-12-31

## 2020-12-29 RX ORDER — DIPHENHYDRAMINE HCL 25 MG
25 CAPSULE ORAL EVERY 4 HOURS PRN
Status: DISCONTINUED | OUTPATIENT
Start: 2020-12-29 | End: 2020-12-31

## 2020-12-29 RX ADMIN — CEFAZOLIN SODIUM/WATER 2 G: 2 G/20 ML SYRINGE (ML) INTRAVENOUS at 07:40:00

## 2020-12-29 RX ADMIN — MORPHINE SULFATE 0.2 MG: 2 INJECTION, SOLUTION INTRAMUSCULAR; INTRAVENOUS at 07:39:00

## 2020-12-29 RX ADMIN — SODIUM CHLORIDE, SODIUM LACTATE, POTASSIUM CHLORIDE, CALCIUM CHLORIDE: 600; 310; 30; 20 INJECTION, SOLUTION INTRAVENOUS at 07:31:00

## 2020-12-29 RX ADMIN — DEXAMETHASONE SODIUM PHOSPHATE 4 MG: 4 MG/ML VIAL (ML) INJECTION at 08:43:00

## 2020-12-29 RX ADMIN — BUPIVACAINE HYDROCHLORIDE 1.6 ML: 7.5 INJECTION, SOLUTION INTRASPINAL at 07:39:00

## 2020-12-29 RX ADMIN — PHENYLEPHRINE HCL 50 MCG: 10 MG/ML VIAL (ML) INJECTION at 07:42:00

## 2020-12-29 RX ADMIN — SODIUM CHLORIDE, SODIUM LACTATE, POTASSIUM CHLORIDE, CALCIUM CHLORIDE: 600; 310; 30; 20 INJECTION, SOLUTION INTRAVENOUS at 08:52:00

## 2020-12-29 RX ADMIN — ONDANSETRON 4 MG: 2 INJECTION INTRAMUSCULAR; INTRAVENOUS at 07:44:00

## 2020-12-29 NOTE — PROGRESS NOTES
NURSING ADMISSION NOTE      Patient admitted via cart  Safety precautions initiated. Bed in low position. Call light in reach. Patient education initiated, patient and family oriented to room and first floor mother baby unit.

## 2020-12-29 NOTE — H&P
[facility]  Obstetrics and Gynecology   Section History & Physical    Kacy Weir Patient Status:  Inpatient    3/22/1990 MRN [id number]   Location [address] [facility] Attending Gold Lopez [facility] Day 0 PCP Nelai Qureshi Streptococcus carrier), +RV culture, currently pregnant     Pregnancy      Plan:   Repeat  section and tubal sterilization via bilateral salpingectomy. Risks, benefits and potential complications reviewed and all questions answered.   Patient under

## 2020-12-29 NOTE — ANESTHESIA PROCEDURE NOTES
Spinal Block  Performed by: Florin Arias MD  Authorized by: Florin Arias MD       General Information and Staff    Start Time:  12/29/2020 7:39 AM  End Time:  12/29/2020 7:40 AM  Anesthesiologist:  Florin Arias MD  Performed by:   Anesthesiologist

## 2020-12-29 NOTE — PROGRESS NOTES
Patient transferred to mother/baby room 1112 per cart in stable condition with baby and personal belongings. Accompanied by  and staff. Report given to mother/baby RN.

## 2020-12-29 NOTE — PAYOR COMM NOTE
--------------  ADMISSION REVIEW     Payor: Eric Gomez #:  [id number]  Authorization Number: [id number]    Admit date: 12/29/20  Admit time: 4548       Admitting Physician: Katie Mortensen MD  Attending Physician: Currently       Objective:      12/29/20  0550   BP: 118/75   Pulse: 83   Resp: 18   Temp: 97.5 °F (36.4 °C)       General: Alert and oriented, no apparent distress  Chest: Clear to auscultation bilaterally, no rales, wheezes or rhonchi  CV: Regular rate a MG/ML injection     Date Action Dose Route User    12/29/2020 8547 Given 4 mg Intravenous Girish So MD      fentaNYL citrate (SUBLIMAZE) 0.05 MG/ML injection     Date Action Dose Route User    12/29/2020 0739 Given 5 mcg Intrathecal Emma Soto M Spinal     Complications:  None     Specimen:   Cord blood     Drains:   Coats     Condition:   Stable to recovery room     EBL:   700 ml     Procedure:  After obtaining informed consent, the patient was placed on the operating room table in the supine p pop-off and vacuum pressure never exceeded 500 mm Hg.   The total time of vacuum placement was approximately 30 seconds.        The anterior and then the posterior shoulder were delivered without difficulty and the rest of the body followed easily.  The umb

## 2020-12-29 NOTE — ANESTHESIA PREPROCEDURE EVALUATION
PRE-OP EVALUATION    Patient Name: Reji Salcedo    Pre-op Diagnosis: Pregnant     Procedure(s):csection      Surgeon(s) and Role:     Daisy Benjamin MD - Primary    Pre-op vitals reviewed.   Temp: 97.5 °F (36.4 °C)  Pulse: 83  Resp: 18  BP: 118/75 Currently      Drug use: Unknown     Available pre-op labs reviewed.   Lab Results   Component Value Date    WBC 7.7 12/29/2020    RBC 4.09 12/29/2020    HGB 10.8 (L) 12/29/2020    HCT 32.3 (L) 12/29/2020    MCV 79.0 (L) 12/29/2020    MCH 26.4 12/29/2020

## 2020-12-29 NOTE — PROGRESS NOTES
Pt is a 31year old female [facility]. Patient presents with:  Scheduled      Pt is  39w2d intra-uterine pregnancy. History obtained, consents signed. Oriented to room, staff, and plan of care.

## 2020-12-29 NOTE — PLAN OF CARE
Problem: SAFETY ADULT - FALL  Goal: Free from fall injury  Description: INTERVENTIONS:  - Assess pt frequently for physical needs  - Identify cognitive and physical deficits and behaviors that affect risk of falls.   - [place] fall precautions as indica previous experience with breast feeding.  - Provide information as needed about early infant feeding cues (e.g., rooting, lip smacking, sucking fingers/hand) versus late cue of crying.  - Discuss/demonstrate breast feeding aids (e.g., infant sling, nursing services/case management support as needed.   Outcome: Progressing

## 2020-12-29 NOTE — ANESTHESIA POSTPROCEDURE EVALUATION
[facility] Carvel Cambridge Patient Status:  Inpatient   Age/Gender 31year old female MRN [id number]   Location [facility] Attending Sarahi Ramey MD   Hosp Day # 0 PCP Franny Vergara MD       Anesthesia Post-op Note    Proc

## 2020-12-29 NOTE — OPERATIVE REPORT
[facility]   Section- Operative Report    Bud Beckwith Patient Status:  Inpatient    3/22/1990 MRN [id number]   Location 18[facility] Attending Nan Burnham MD   Hosp Day # 0 PCP Ronna Yusuf MD     Preoperative cavity was entered bluntly and the incision extended laterally in the lower uterine segment using blunt dissection. Amniotic membranes were then ruptured with clear fluid noted. The infant's head was found to be in vertex presentation.    Due to the unmol re-approximated using interrupted stitches of 2.0 plain gut, and the skin was then re-approximated using Insorb absorbable staples. A sterile bandage was applied. The infant and mother were transferred to the recovery room in stable condition.   Sponge, n

## 2020-12-30 LAB
BASOPHILS # BLD AUTO: 0.02 X10(3) UL (ref 0–0.2)
BASOPHILS NFR BLD AUTO: 0.2 %
DEPRECATED RDW RBC AUTO: 39.9 FL (ref 35.1–46.3)
EOSINOPHIL # BLD AUTO: 0.01 X10(3) UL (ref 0–0.7)
EOSINOPHIL NFR BLD AUTO: 0.1 %
ERYTHROCYTE [DISTWIDTH] IN BLOOD BY AUTOMATED COUNT: 13.5 % (ref 11–15)
HCT VFR BLD AUTO: 29.6 %
HGB BLD-MCNC: 9.7 G/DL
IMM GRANULOCYTES # BLD AUTO: 0.04 X10(3) UL (ref 0–1)
IMM GRANULOCYTES NFR BLD: 0.4 %
LYMPHOCYTES # BLD AUTO: 2.21 X10(3) UL (ref 1–4)
LYMPHOCYTES NFR BLD AUTO: 23.4 %
MCH RBC QN AUTO: 26.7 PG (ref 26–34)
MCHC RBC AUTO-ENTMCNC: 32.8 G/DL (ref 31–37)
MCV RBC AUTO: 81.5 FL
MONOCYTES # BLD AUTO: 0.53 X10(3) UL (ref 0.1–1)
MONOCYTES NFR BLD AUTO: 5.6 %
NEUTROPHILS # BLD AUTO: 6.65 X10 (3) UL (ref 1.5–7.7)
NEUTROPHILS # BLD AUTO: 6.65 X10(3) UL (ref 1.5–7.7)
NEUTROPHILS NFR BLD AUTO: 70.3 %
PLATELET # BLD AUTO: 197 10(3)UL (ref 150–450)
RBC # BLD AUTO: 3.63 X10(6)UL
WBC # BLD AUTO: 9.5 X10(3) UL (ref 4–11)

## 2020-12-30 NOTE — PROGRESS NOTES
POD#1  No C/O  Afebrile, VS stable  Hg: pending  Coats removed, voiding  Abdomen soft distension, no flatus yet  Tolerating PO  Stable, CPM

## 2020-12-30 NOTE — PLAN OF CARE
Problem: SAFETY ADULT - FALL  Goal: Free from fall injury  Description: INTERVENTIONS:  - Assess pt frequently for physical needs  - Identify cognitive and physical deficits and behaviors that affect risk of falls.   - [place] fall precautions as indica prophylaxis as needed. - Monitor bowel function.  - Encourage ambulation and provide assistance as needed. - Assess and monitor emotional status and provide social service/psych resources as needed.   - Utilize standard precautions and use personal protec

## 2020-12-30 NOTE — PLAN OF CARE
Problem: SAFETY ADULT - FALL  Goal: Free from fall injury  Description: INTERVENTIONS:  - Assess pt frequently for physical needs  - Identify cognitive and physical deficits and behaviors that affect risk of falls.   - [place] fall precautions as indica

## 2020-12-30 NOTE — PROGRESS NOTES
[address] 1SW-J n Margarito Schwab Patient Status:  Inpatient   Age/Gender 31year old female MRN [id number]   [facility] 1SW-J Attending Socrates Courtney MD   Hosp Day # 1 PCP Maki Burns MD      Anesthesia Pain Progress N

## 2020-12-31 VITALS
HEART RATE: 85 BPM | TEMPERATURE: 98 F | HEIGHT: 65 IN | RESPIRATION RATE: 16 BRPM | WEIGHT: 237 LBS | DIASTOLIC BLOOD PRESSURE: 73 MMHG | SYSTOLIC BLOOD PRESSURE: 102 MMHG | BODY MASS INDEX: 39.49 KG/M2 | OXYGEN SATURATION: 98 %

## 2020-12-31 RX ORDER — GABAPENTIN 300 MG/1
300 CAPSULE ORAL EVERY 8 HOURS PRN
Qty: 20 CAPSULE | Refills: 0 | Status: SHIPPED | OUTPATIENT
Start: 2020-12-31 | End: 2021-01-14

## 2020-12-31 RX ORDER — IBUPROFEN 600 MG/1
600 TABLET ORAL EVERY 6 HOURS
Qty: 30 TABLET | Refills: 0 | Status: SHIPPED | OUTPATIENT
Start: 2020-12-31 | End: 2021-01-14

## 2020-12-31 NOTE — PAYOR COMM NOTE
--------------  CONTINUED STAY REVIEW    Payor: Eric Gomez #:  [id number]  Authorization Number: [id number]    Admit date: 12/29/20  Admit time: 5144    Admitting Physician: Naila Hemphill MD  Attending Physician:

## 2020-12-31 NOTE — PROGRESS NOTES
POD#2  No C/O  Afebrile, VS stable  Wound clean  Minimal bleeding  Stable, wishes to go home  discharge

## 2020-12-31 NOTE — PROGRESS NOTES
Discharge instructions reviewed with patient and significant other. Patient encouraged to ask questions and discharge paperwork provided. Teal band explained and given to patient.  Patient encouraged to call office and make follow up OB/GYN appointment for

## 2021-01-04 ENCOUNTER — TELEPHONE (OUTPATIENT)
Dept: OBGYN UNIT | Facility: HOSPITAL | Age: 31
End: 2021-01-04

## 2021-01-04 NOTE — DISCHARGE SUMMARY
[facility]  Discharge Summary    Linda Gurrola Patient Status:  Inpatient    3/22/1990 MRN [id number]   [facility] 1SW-J Attending No att. providers found   Hosp Day # 2 PCP Yennifer Hope MD     Date of Admission: 2020    Da Historical                      Jluis Safer  1/4/2021  3:01 PM

## 2021-01-04 NOTE — PROGRESS NOTES
Outgoing Cradle Call completed. Mom reports that she and infant are doing well. Has had pediatrician follow-up visit. Reminded to schedule postpartum follow-up visit. No complaints of PPD. Reviewed basic self and infant care.   Encouraged to follow-u

## 2021-01-13 PROBLEM — Z3A.20 20 WEEKS GESTATION OF PREGNANCY (HCC): Status: RESOLVED | Noted: 2020-09-15 | Resolved: 2021-01-13

## 2021-01-13 PROBLEM — Z34.90 PREGNANCY (HCC): Status: RESOLVED | Noted: 2020-12-29 | Resolved: 2021-01-13

## 2021-01-13 PROBLEM — O99.820 GBS (GROUP B STREPTOCOCCUS CARRIER), +RV CULTURE, CURRENTLY PREGNANT: Status: RESOLVED | Noted: 2020-12-23 | Resolved: 2021-01-13

## 2021-01-13 PROBLEM — O99.820 GBS (GROUP B STREPTOCOCCUS CARRIER), +RV CULTURE, CURRENTLY PREGNANT (HCC): Status: RESOLVED | Noted: 2020-12-23 | Resolved: 2021-01-13

## 2021-01-13 PROBLEM — Z3A.20 20 WEEKS GESTATION OF PREGNANCY: Status: RESOLVED | Noted: 2020-09-15 | Resolved: 2021-01-13

## 2021-01-13 PROBLEM — Z34.90 PREGNANCY: Status: RESOLVED | Noted: 2020-12-29 | Resolved: 2021-01-13

## 2021-01-14 ENCOUNTER — POSTPARTUM (OUTPATIENT)
Dept: OBGYN CLINIC | Facility: CLINIC | Age: 31
End: 2021-01-14
Payer: MEDICAID

## 2021-01-14 VITALS
BODY MASS INDEX: 36.32 KG/M2 | HEIGHT: 65 IN | SYSTOLIC BLOOD PRESSURE: 114 MMHG | DIASTOLIC BLOOD PRESSURE: 78 MMHG | WEIGHT: 218 LBS

## 2021-01-14 DIAGNOSIS — Z98.890 POST-OPERATIVE STATE: Primary | ICD-10-CM

## 2021-01-14 PROCEDURE — 3008F BODY MASS INDEX DOCD: CPT | Performed by: OBSTETRICS & GYNECOLOGY

## 2021-01-14 PROCEDURE — 3074F SYST BP LT 130 MM HG: CPT | Performed by: OBSTETRICS & GYNECOLOGY

## 2021-01-14 PROCEDURE — 3078F DIAST BP <80 MM HG: CPT | Performed by: OBSTETRICS & GYNECOLOGY

## 2021-01-14 NOTE — PROGRESS NOTES
Subjective:  Patient presents with:  Postpartum Care: 2 week incision check     Patient presents for 2 week post operative visit from  section. Currently without complaints. Tolerating general diet. Regular bowel movements.   No urinary complaint

## 2021-02-12 ENCOUNTER — POSTPARTUM (OUTPATIENT)
Dept: OBGYN CLINIC | Facility: CLINIC | Age: 31
End: 2021-02-12
Payer: MEDICAID

## 2021-02-12 VITALS
HEIGHT: 65 IN | WEIGHT: 217 LBS | BODY MASS INDEX: 36.15 KG/M2 | SYSTOLIC BLOOD PRESSURE: 126 MMHG | DIASTOLIC BLOOD PRESSURE: 82 MMHG

## 2021-02-12 DIAGNOSIS — Z12.4 SCREENING FOR CERVICAL CANCER: ICD-10-CM

## 2021-02-12 PROCEDURE — 3008F BODY MASS INDEX DOCD: CPT | Performed by: OBSTETRICS & GYNECOLOGY

## 2021-02-12 PROCEDURE — 3074F SYST BP LT 130 MM HG: CPT | Performed by: OBSTETRICS & GYNECOLOGY

## 2021-02-12 PROCEDURE — 88175 CYTOPATH C/V AUTO FLUID REDO: CPT | Performed by: OBSTETRICS & GYNECOLOGY

## 2021-02-12 PROCEDURE — 3079F DIAST BP 80-89 MM HG: CPT | Performed by: OBSTETRICS & GYNECOLOGY

## 2021-02-12 NOTE — PROGRESS NOTES
Subjective:  Patient presents with:  Postpartum Care: EPDS 0    Laura Gonzalez presents for her 6 week post partum exam after  section and bilateral salpingectomy. She denies any gastrointestinal/genitourinary complaints.   She denies any symptom

## 2021-02-18 ENCOUNTER — MED REC SCAN ONLY (OUTPATIENT)
Dept: OBGYN CLINIC | Facility: CLINIC | Age: 31
End: 2021-02-18

## 2023-09-28 ENCOUNTER — OFFICE VISIT (OUTPATIENT)
Dept: FAMILY MEDICINE CLINIC | Facility: CLINIC | Age: 33
End: 2023-09-28
Payer: MEDICAID

## 2023-09-28 VITALS
OXYGEN SATURATION: 98 % | HEART RATE: 103 BPM | RESPIRATION RATE: 16 BRPM | SYSTOLIC BLOOD PRESSURE: 116 MMHG | HEIGHT: 65 IN | DIASTOLIC BLOOD PRESSURE: 70 MMHG | BODY MASS INDEX: 36.65 KG/M2 | TEMPERATURE: 98 F | WEIGHT: 220 LBS

## 2023-09-28 DIAGNOSIS — L72.3 SEBACEOUS CYST OF AXILLA: ICD-10-CM

## 2023-09-28 DIAGNOSIS — Z00.00 WELLNESS EXAMINATION: Primary | ICD-10-CM

## 2023-09-28 PROCEDURE — 3074F SYST BP LT 130 MM HG: CPT | Performed by: FAMILY MEDICINE

## 2023-09-28 PROCEDURE — 3008F BODY MASS INDEX DOCD: CPT | Performed by: FAMILY MEDICINE

## 2023-09-28 PROCEDURE — 99213 OFFICE O/P EST LOW 20 MIN: CPT | Performed by: FAMILY MEDICINE

## 2023-09-28 PROCEDURE — 3078F DIAST BP <80 MM HG: CPT | Performed by: FAMILY MEDICINE

## 2023-09-28 PROCEDURE — 99395 PREV VISIT EST AGE 18-39: CPT | Performed by: FAMILY MEDICINE

## 2023-09-28 RX ORDER — CEPHALEXIN 250 MG/1
250 TABLET ORAL 2 TIMES DAILY
Qty: 20 TABLET | Refills: 0 | Status: SHIPPED | OUTPATIENT
Start: 2023-09-28 | End: 2023-10-08

## 2023-09-28 NOTE — PATIENT INSTRUCTIONS
Thank you for choosing Rita Pearl MD at [place] [address]  To Do: Stephanie BUNRS Preston  1. Please see age appropriate health prevention below   [facility] is located in [address]. Monday, Tuesday & Friday - 8 a.m. to 4 p.m. Wednesday, Thursday - 7 a.m. to 3 p.m. The lab is closed daily from 12 p.m.-12:30 p.m. Saturday lab hours by appointment. Call [phone] to schedule the appointment. Please signup for [employer], which is electronic access to your record if you have not done so. All your results will post on there. [email or web address]   You can NOW use [employer] to book your appointments with us, or consider using open access scheduling which is through the edward website [email or web address] and type in Rita Pearl MD and follow the links for \"Schedule Online Now\"    To schedule Imaging or tests at [facility] Scheduling [phone], Go to [facility] A ER Building (For example: CT scans, X rays, Ultrasound, MRI)  Cardiac Testing in ER building Building A second floor Cardiac Testing [phone] (For example: Holter Monitor, Cardiac Stress tests,Event Monitor, or 2D Echocardiograms)  [facility] call [phone] usually in [facility] A  Walk in Clinic in [place] at [facility]. [address] Mon-Fri at 8am-7:30 p.m., and Sat/Sun 9:00a. m.-4:30 p.m. Also at [facility]  Call [phone] for info     Please call our office about any questions regarding your treatment/medicines/tests as a result of today's visit. For your safety, read the entire package insert of all medicines prescribed to you and be aware of all of the risks of treatment even beyond those discussed today. All therapies have potential risk of harm or side effects or medication interactions.   It is your duty and for your safety to discuss with the pharmacist and our office with questions, and to notify us and stop treatment if problems arise, but know that our intention is that the benefits outweigh those potential risks and we strive to make you healthier and to improve your quality of life. Referrals, and Radiology Information:    If your insurance requires a referral to a specialist, please allow 5 business days to process your referral request.    If Rachael Arzola MD orders a CT or MRI, it may take up to 10 business days to receive approval from your insurance company. Once our office has called informing you that the insurance company approved your testing, please call Central Scheduling at [phone]  Please allow our office 5 business days to contact you regarding any testing results. Refill policies:   Allow 3 business days for refills; controlled substances may take longer and must be picked up from the office in person. Narcotic medications can only be filled in 30 day increments and must be refilled at an office visit only. If your prescription is due for a refill, you may be due for a follow-up appointment. We cannot refill your maintenance medications at a preventative wellness visit. To best provide you care, patients receiving maintenance medications need to be seen at least twice a year.

## 2023-10-05 ENCOUNTER — LAB ENCOUNTER (OUTPATIENT)
Dept: LAB | Age: 33
End: 2023-10-05
Attending: FAMILY MEDICINE

## 2023-10-05 LAB
ALBUMIN SERPL-MCNC: 3.6 G/DL (ref 3.4–5)
ALBUMIN/GLOB SERPL: 0.9 {RATIO} (ref 1–2)
ALP LIVER SERPL-CCNC: 84 U/L
ALT SERPL-CCNC: 27 U/L
ANION GAP SERPL CALC-SCNC: 6 MMOL/L (ref 0–18)
AST SERPL-CCNC: 17 U/L (ref 15–37)
BASOPHILS # BLD AUTO: 0.02 X10(3) UL (ref 0–0.2)
BASOPHILS NFR BLD AUTO: 0.3 %
BILIRUB SERPL-MCNC: 0.4 MG/DL (ref 0.1–2)
BUN BLD-MCNC: 9 MG/DL (ref 7–18)
CALCIUM BLD-MCNC: 8.8 MG/DL (ref 8.5–10.1)
CHLORIDE SERPL-SCNC: 108 MMOL/L (ref 98–112)
CHOLEST SERPL-MCNC: 145 MG/DL (ref ?–200)
CO2 SERPL-SCNC: 24 MMOL/L (ref 21–32)
CREAT BLD-MCNC: 0.76 MG/DL
EGFRCR SERPLBLD CKD-EPI 2021: 107 ML/MIN/1.73M2 (ref 60–?)
EOSINOPHIL # BLD AUTO: 0.04 X10(3) UL (ref 0–0.7)
EOSINOPHIL NFR BLD AUTO: 0.6 %
ERYTHROCYTE [DISTWIDTH] IN BLOOD BY AUTOMATED COUNT: 14.9 %
FASTING PATIENT LIPID ANSWER: YES
FASTING STATUS PATIENT QL REPORTED: YES
GLOBULIN PLAS-MCNC: 3.9 G/DL (ref 2.8–4.4)
GLUCOSE BLD-MCNC: 105 MG/DL (ref 70–99)
HCT VFR BLD AUTO: 39.9 %
HDLC SERPL-MCNC: 51 MG/DL (ref 40–59)
HGB BLD-MCNC: 12.6 G/DL
IMM GRANULOCYTES # BLD AUTO: 0.02 X10(3) UL (ref 0–1)
IMM GRANULOCYTES NFR BLD: 0.3 %
LDLC SERPL CALC-MCNC: 75 MG/DL (ref ?–100)
LYMPHOCYTES # BLD AUTO: 2.09 X10(3) UL (ref 1–4)
LYMPHOCYTES NFR BLD AUTO: 31.6 %
MCH RBC QN AUTO: 27 PG (ref 26–34)
MCHC RBC AUTO-ENTMCNC: 31.6 G/DL (ref 31–37)
MCV RBC AUTO: 85.6 FL
MONOCYTES # BLD AUTO: 0.26 X10(3) UL (ref 0.1–1)
MONOCYTES NFR BLD AUTO: 3.9 %
NEUTROPHILS # BLD AUTO: 4.18 X10 (3) UL (ref 1.5–7.7)
NEUTROPHILS # BLD AUTO: 4.18 X10(3) UL (ref 1.5–7.7)
NEUTROPHILS NFR BLD AUTO: 63.3 %
NONHDLC SERPL-MCNC: 94 MG/DL (ref ?–130)
OSMOLALITY SERPL CALC.SUM OF ELEC: 285 MOSM/KG (ref 275–295)
PLATELET # BLD AUTO: 241 10(3)UL (ref 150–450)
POTASSIUM SERPL-SCNC: 4.4 MMOL/L (ref 3.5–5.1)
PROT SERPL-MCNC: 7.5 G/DL (ref 6.4–8.2)
RBC # BLD AUTO: 4.66 X10(6)UL
SODIUM SERPL-SCNC: 138 MMOL/L (ref 136–145)
T4 FREE SERPL-MCNC: 0.9 NG/DL (ref 0.8–1.7)
TRIGL SERPL-MCNC: 105 MG/DL (ref 30–149)
TSI SER-ACNC: 1.15 MIU/ML (ref 0.36–3.74)
VIT B12 SERPL-MCNC: 329 PG/ML (ref 193–986)
VIT D+METAB SERPL-MCNC: 21.7 NG/ML (ref 30–100)
VLDLC SERPL CALC-MCNC: 16 MG/DL (ref 0–30)
WBC # BLD AUTO: 6.6 X10(3) UL (ref 4–11)

## 2023-10-06 ENCOUNTER — OFFICE VISIT (OUTPATIENT)
Facility: LOCATION | Age: 33
End: 2023-10-06

## 2023-10-06 DIAGNOSIS — L73.2 HIDRADENITIS AXILLARIS: Primary | ICD-10-CM

## 2023-10-06 PROCEDURE — 99243 OFF/OP CNSLTJ NEW/EST LOW 30: CPT | Performed by: STUDENT IN AN ORGANIZED HEALTH CARE EDUCATION/TRAINING PROGRAM

## 2023-10-06 NOTE — H&P
Patient ID: Elaine Mansfield is a 34year old female. Patient presents with:  New Patient: NP - SEBACEOUS CYST OF AXILLA, INFLAMMATION, DRAINAGE, ITCHES, NO OTHER SYMPTOMS.      HPI: Elaine Mansfield is a 34year old female presents to clinic for evaluation. Patient reports having lumps in her axilla bilaterally since July of this year. She reports having intermittent flares where she has pain, redness, and drainage. Last flare on the right side was last week. She is currently having a flare on her left side which is improving. She denies any other medical problems. Workup: None      Past Medical History  History reviewed. No pertinent past medical history. Past Surgical History  Past Surgical History:   Procedure Laterality Date            2020    with bilateral salpingectomy       Medications  Current Outpatient Medications   Medication Sig Dispense Refill    Cephalexin 250 MG Oral Tab Take 250 mg by mouth 2 (two) times daily for 10 days. 20 tablet 0       Allergies  No Known Allergies    Social History    Smoking status:   Never      Smokeless tobacco:   Never       Alcohol use   Not Currently       Drug use:   Unknown       Family History  Family History   Problem Relation Age of Onset    Diabetes Father     Hypertension Father     Diabetes Mother        Review of Systems  Review of Systems   Constitutional: Negative. Respiratory: Negative. Cardiovascular: Negative. Gastrointestinal: Negative. Skin:  Positive for wound (Masses with drainage from her axilla bilaterally). Exam  There were no vitals filed for this visit. Physical Exam  Constitutional:       Appearance: Normal appearance. Pulmonary:      Effort: Pulmonary effort is normal.   Chest:       Skin:     General: Skin is warm and dry. Neurological:      Mental Status: She is alert and oriented to person, place, and time.            Assessment/Plan  Assessment   Problem List Items Addressed This Visit          Skin    Hidradenitis axillaris - Primary       Mina Curtis is a 34year old female with hidradenitis of her axilla bilaterally    Per history and on physical exam, patient has hidradenitis of her axilla bilaterally  Her right axilla does not show any signs of active disease but has healing consistent with prior hidradenitis infection  Her left axilla has an active sinus that is healing, no drainage was able to be expressed  Patient does not have any evidence of a sebaceous cyst or central punctum consistent with a sebaceous cyst in either axilla    I discussed with the patient that this is a disease of her hair follicles  Hidradenitis can show up in the axilla, inframammary fold, and groins  Her disease is very mild although she has some evidence of active disease on the left  Typical treatment is antibiotics during a flare  Patient has been dealing with intermittent flares since July and wishes for definitive treatment  Because patient has active disease on the left, will plan for excision in the operating room  Risk of the procedure discussed, these include bleeding, pain, infection  Postoperative restrictions will be limited to no overhead movements to prevent wound dehiscence  Patient is also at risk of developing hidradenitis in other parts of her axilla  If patient continues have symptoms of the right axilla, will plan for excision  Patient acknowledged understanding and wishes to proceed    Patient is to call my office for any questions or concerns, especially if she has another flare while waiting for surgical intervention    [facility]     CC:   Brandi Shin MD

## 2023-10-25 ENCOUNTER — TELEPHONE (OUTPATIENT)
Facility: LOCATION | Age: 33
End: 2023-10-25

## 2023-10-25 NOTE — TELEPHONE ENCOUNTER
I lvm to call us to verify [facility] ins is inactive as of 9/1/23. I called to verify eligibility and was given term date. Ask if has new ins, and let Gary know.

## 2023-10-26 NOTE — TELEPHONE ENCOUNTER
Left 2nd vm for patient to call asap to verify if has new ins. Need to update sx  Gary. [employer] termed out 9/1/23.

## 2023-10-27 ENCOUNTER — TELEPHONE (OUTPATIENT)
Facility: LOCATION | Age: 33
End: 2023-10-27

## 2023-10-27 DIAGNOSIS — L73.2 HIDRADENITIS AXILLARIS: ICD-10-CM

## 2023-10-27 DIAGNOSIS — L73.2 HIDRADENITIS SUPPURATIVA: Primary | ICD-10-CM

## 2024-03-21 ENCOUNTER — OFFICE VISIT (OUTPATIENT)
Dept: OBGYN CLINIC | Facility: CLINIC | Age: 34
End: 2024-03-21
Payer: COMMERCIAL

## 2024-03-21 VITALS
BODY MASS INDEX: 34.99 KG/M2 | HEIGHT: 65 IN | SYSTOLIC BLOOD PRESSURE: 122 MMHG | WEIGHT: 210 LBS | HEART RATE: 109 BPM | DIASTOLIC BLOOD PRESSURE: 80 MMHG

## 2024-03-21 DIAGNOSIS — Z12.4 SCREENING FOR CERVICAL CANCER: ICD-10-CM

## 2024-03-21 DIAGNOSIS — Z01.419 ENCOUNTER FOR GYNECOLOGICAL EXAMINATION WITHOUT ABNORMAL FINDING: Primary | ICD-10-CM

## 2024-03-21 DIAGNOSIS — Z11.3 SCREEN FOR STD (SEXUALLY TRANSMITTED DISEASE): ICD-10-CM

## 2024-03-21 PROBLEM — Z80.3 FAMILY HISTORY OF BREAST CANCER: Status: ACTIVE | Noted: 2024-03-21

## 2024-03-21 PROCEDURE — 3079F DIAST BP 80-89 MM HG: CPT | Performed by: OBSTETRICS & GYNECOLOGY

## 2024-03-21 PROCEDURE — 3074F SYST BP LT 130 MM HG: CPT | Performed by: OBSTETRICS & GYNECOLOGY

## 2024-03-21 PROCEDURE — 87591 N.GONORRHOEAE DNA AMP PROB: CPT | Performed by: OBSTETRICS & GYNECOLOGY

## 2024-03-21 PROCEDURE — 3008F BODY MASS INDEX DOCD: CPT | Performed by: OBSTETRICS & GYNECOLOGY

## 2024-03-21 PROCEDURE — 87491 CHLMYD TRACH DNA AMP PROBE: CPT | Performed by: OBSTETRICS & GYNECOLOGY

## 2024-03-21 PROCEDURE — 99395 PREV VISIT EST AGE 18-39: CPT | Performed by: OBSTETRICS & GYNECOLOGY

## 2024-03-21 PROCEDURE — 87624 HPV HI-RISK TYP POOLED RSLT: CPT | Performed by: OBSTETRICS & GYNECOLOGY

## 2024-03-21 NOTE — PROGRESS NOTES
Subjective:  Chief Complaint   Patient presents with    Annual     33 year old female who presents for annual well woman visit without complaints.   from partner/FOB.  Mother diagnosed with breast cancer age 49 and did test positive for cancer gene, unaware which one.    Patient's last menstrual period was 03/01/2024 (exact date).  Hx Prior Abnormal Pap: No  Pap Date: 02/12/21  Pap Result Notes: wnl  Menarche: 13 (3/21/2024  1:31 PM)  Period Cycle (Days): 28 days (3/21/2024  1:31 PM)  Period Duration (Days): 4 days (3/21/2024  1:31 PM)  Period Flow: Moderate (3/21/2024  1:31 PM)  Use of Birth Control (if yes, specify type): None (3/21/2024  1:31 PM)  Date When Birth Control Last Used: Last time pt used OCP was 1 yr ago (3/21/2024  1:31 PM)  Hx Prior Abnormal Pap: No (3/21/2024  1:31 PM)  Pap Date: 02/12/21 (3/21/2024  1:31 PM)  Pap Result Notes: wnl (3/21/2024  1:31 PM)      Last Pap smear: 2021     Abnormal Pap: n    Pelvic Infections/STD: None  Contraception: salpingectomy    Review of Systems:  Pertinent items are noted in the HPI.    Patient History:  New Medical Illness: None  New Surgeries: None  New Family History: None   reports that she has never smoked. She has never used smokeless tobacco.   reports current alcohol use of about 2.0 standard drinks of alcohol per week.    Most Recent Immunizations   Administered Date(s) Administered    TDAP 10/16/2020       Objective:  /80   Pulse 109   Ht 65\"   Wt 210 lb (95.3 kg)   LMP 03/01/2024 (Exact Date)   Physical Examination:  General appearance: Well dressed, well nourished in no apparent distress  Neurologic/Psychiatric: Alert and oriented to person, place and time, mood normal, affect appropriate  Head: Normocephalic without obvious deformity, atraumatic  Neck: No thyromegaly, supple, non-tender, no masses, no adenopathy  Lungs: Clear to auscultation bilaterally, no rales, wheezes or rhonchi  Breasts: Symmetric, non-tender, no masses,  lesions, retraction, dimpling or discharge bilaterally, no axillary or supraclavicular lymphadenopathy  Heart:: Regular rate and rhythm, no gallops or murmurs  Abdomen: Soft, non-tender, non-distended, no masses, no hepatosplenomegaly, no hernias, no inguinal lymphadenopathy  Pelvic:    External genitalia- Normal, Bartholin's, urethra, skeins glands normal   Vagina- No vaginal lesions, no discharge   Cervix- No lesions, long/closed, no cervical motion tenderness   Uterus- Normal sized, anteverted, non-tender, no masses   Adnexa-  Non-tender, no masses  Extremities: Non-tender, full range of motion, no clubbing, cyanosis or edema  Skin:  General inspection- no rashes, lesions or discoloration  Rectum: No hemorrhoids, no masses.    Assessment/Plan:  Normal well-woman exam.  Pap/HPV smear obtained.  Family history of breast cancer/breast cancer gene- patient will check what gene.  Strongly encourage to get genetic counseling and testing.  Discussed implications if patient tests positive with increased risk both breast/colon/ovarian cancer.    Patient offered chaperone for exam, declined    Diagnoses and all orders for this visit:    Encounter for gynecological examination without abnormal finding    Screen for STD (sexually transmitted disease)  -     Chlamydia/Gc Amplification    Screening for cervical cancer  -     ThinPrep PAP Smear; Future  -     Hpv Dna  High Risk , Thin Prep Collect; Future      Return in about 1 year (around 3/21/2025) for Annual Well Woman Exam.

## 2024-03-22 LAB
C TRACH DNA SPEC QL NAA+PROBE: NEGATIVE
HPV I/H RISK 1 DNA SPEC QL NAA+PROBE: NEGATIVE
N GONORRHOEA DNA SPEC QL NAA+PROBE: NEGATIVE

## 2024-03-27 LAB
.: NORMAL
.: NORMAL

## 2024-04-03 ENCOUNTER — OFFICE VISIT (OUTPATIENT)
Dept: INTERNAL MEDICINE CLINIC | Facility: CLINIC | Age: 34
End: 2024-04-03
Payer: COMMERCIAL

## 2024-04-03 VITALS
DIASTOLIC BLOOD PRESSURE: 82 MMHG | RESPIRATION RATE: 16 BRPM | HEART RATE: 84 BPM | SYSTOLIC BLOOD PRESSURE: 120 MMHG | HEIGHT: 64 IN | BODY MASS INDEX: 34.66 KG/M2 | WEIGHT: 203 LBS

## 2024-04-03 DIAGNOSIS — E66.9 OBESITY (BMI 30-39.9): ICD-10-CM

## 2024-04-03 DIAGNOSIS — Z51.81 THERAPEUTIC DRUG MONITORING: Primary | ICD-10-CM

## 2024-04-03 PROCEDURE — 99204 OFFICE O/P NEW MOD 45 MIN: CPT | Performed by: NURSE PRACTITIONER

## 2024-04-03 PROCEDURE — 3008F BODY MASS INDEX DOCD: CPT | Performed by: NURSE PRACTITIONER

## 2024-04-03 PROCEDURE — 3074F SYST BP LT 130 MM HG: CPT | Performed by: NURSE PRACTITIONER

## 2024-04-03 PROCEDURE — 3079F DIAST BP 80-89 MM HG: CPT | Performed by: NURSE PRACTITIONER

## 2024-04-03 NOTE — PATIENT INSTRUCTIONS
Welcome to the [facility]!!  Thank you for placing your trust in our health care team, I look forward to working with you along this journey to better health!  Next steps:     1.  Schedule a personal nutrition consultation with one of our registered dieticians. Bring along your food journal (3 days minimum). See journal options below.  2.  get EKG done     Please try to work on the following dietary changes this first month:  Daily protein recommendation to start:  grams  Daily carbohydrate: <120g  Daily calories: 1,400-1,500  1.  Drink water with meals and throughout the day, cut down on soda and/or juice if consumed. Consider flavored water options like Bubbly, Spindrift, Hint and Gabe.  2.  Eat breakfast daily and focus on having protein with each meal, examples include: greek yogurt, cottage cheese, hard boiled egg, whole grain toast with peanut butter.   3.  Reduce refined carbohydrates and sugars which includes items such as sweets, as well as rice, pasta, and bread and make sure to choose whole grain options when having them with just 1 serving per meal about the size of your inner palm.  4.  Consume non starchy veggies daily working towards making them a good 50% of your daily food intake. Add them to lunch and dinner consistently.  5.  Optional can start a daily probiotic: VSL#3, (order on line at www.vsl3.com). Take 1 capsule daily with water for 30 days, then reduce to 1 every other day (this will reduce the cost). Capsules can be left out for 2 weeks, but then must be refrigerated.      Please download nadeen My Fitness Pal, LoseIt! Or Net Diary to monitor daily dietary intake and you will be able to see if you are eating the right amount of calories or too much or too little which would hinder weight loss. Additionally this will help to see your daily carbohydrate and protein intake. When you set the nadeen up choose 1-2 lbs/week as a goal.  Keeping a paper food journal  is an option as well to remain accountable for your choices- this is the start to mindful eating! A low calorie diet has been consistently shown to support weight loss.     Continue or start exercising to help establish a routine. If not already exercising begin with 1 day and progress as able with long-term goal of 30 minutes 5 days a week at a minimum.     Meditation daily can help manage and control stress. Chronic stress can make weight loss difficult.  Exercising is one way to help with stress, but meditation using the CALM Fransisco or another comparable alternative can be done in your home or place of work with little time commitment. This Fransisco can also help work on behavior change and improve sleep. Check out the segment under Calm Masterclass and listen to The 4 Pillars of Health. A great way to begin learning about the foundation of lifestyle with practical tips to use in your every day.     Check out www.yourweightmatters.org blog for continued daily support and education along this weight loss journey!    Patient Resources:     Personal Training/Fitness Classes/Health Coaching     [facility] @ [email or web address] Full fitness center with group fitness and personal training. Discount available as client of [facility].  Health Coaching and Personal Training with Jocelyne Estes at our [facility]- individual weekly coaching with option to add personal training and small group fitness classes targeted at weight loss- [phone] and/or email @ [email or web address]  360F[employer] [place] [email or web address] Group Fitness [phone] and/or email Joan [email or web address]  [facility] Fitness Centers with multiple locations: Third Chicken (www.Homeloc), Viridis Learning The Elli Fitness (www.[email or web address].[email or web address]), ScriptRx (www.Anomaly Innovations), The Exercise   (www.exercisecoach.Qualifacts Systems)     Online Fitness  Fitness  on Utube  Fit in 10 DVD series- www.ekgmy08BGG.com  Sit and Be Fit - Chair exercise series Www.sitandbefit.org  Hip Hop Fit with Cesar Todd at www.hiphopfit.net     Apps for on the Go Fitness  [place] 7 Minute Workout (orange box with white 7) - free on the go HIIT training fransisco  Peloton Fransisco @ www.onepeloton.com     Nutrition Trackers and Tools  LoseIT! And My Fitness Pal apps and on line for tracking nutrition  NOOM - virtual health coaching  FitFoundation (healthy meals on the go) in [place] @ www.qzfosqsuvoygx9wAcqua Innovations  Bistro MD @ uSampbistromd.com and Rzjtij40 (keto and low carb plans recommended) @ [email or web address], Metabolic Meals @ www.UTStarcomMetabolicMeals.Qualifacts Systems - individual prepared meals to go  Gobble, Blue Apron, Home , Every Plate, Sunbasket- on line meal delivery programs for preparation at home  [place]al [place] in [place] for homemade meals to go @ wwwExpertFlyermealPolynova Cardiovascular  Diet Doctor @ www.dietdoctor.com - low carb swaps  MarketTools - meal prep and planning fransisco (www.yummly.com)     Stress Management/Behavior/Mindful Eating  CALM meditation fransisco (www.calm.com)  Headspace  Am I Hungry? Mindful eating virtual  fransisco  Www.yourweightmatters.org - Obesity Action Coalition sponsored Blog posts daily  Motivation fransisco (black box with white \")- daily supportive messages sent to your phone     Books/Video Education/Podcasts  Mindless Eating by Rui Stephens  Why We Get Sick by Garry Alegria (a book about insulin resistance)  Atomic Habits by Royce Myrick (a book about taking small steps to promote greater behavior change)   Can't Hurt Me by Flako Arias (a book exploring the power of discipline in achieving your goals)  The End of Dieting: How to Live for Life by Dr. Evelio Canada M.D. or listen to [employer] Podcast Episode 63: Understanding \"Nutritarian\" Eating w/Dr. Evelio Canada  Your Body in Balance: The New Science of Food, Hormones, and Health by   Enrique Smith  The Menopause Diet Plan by Aubrie Evans and Kim Nunes  The Complete Guide to fasting by Dr. Loera  Sugar, Salt & Fat by Rody Roca, Ph.D, R.D.  Weight Loss Surgery Will Not Treat Food Addiction by Paige Zavala Ph.D  The Game Changers- Netflix Documentary on plant based nutrition  Fed Up - documentary about obesity (Free on Utube)  The Truth About Sugar - documentary on sugar (Free on Utube, https://youtu.be/9O8lqkyRP1n)  The Dr. Young T5 Wellness Plan by Dr. Joel Young MD  Fitlosophy Fitspiration - journal to better health (found at [employer] in fitness aisle)  What Happened to You?- a look at the impact trauma has on behavior written by Edgar Rhodes and Dr. Leonardo Mills  Whole Again by Colby Miranda - discovering your true self after trauma  Lavon Ramachandran talk on Cubito, The Call to Courage  Podcasts: The Exam Room by the Physician's Committee, Nutrition Facts by Dr. Gates    We are here to support you with weight loss, but please remember that you still need your primary care provider for your routine health maintenance.

## 2024-04-03 NOTE — PROGRESS NOTES
HISTORY OF PRESENT ILLNESS  Chief Complaint   Patient presents with    Weight Problem     Recommendation from A friend, never try meds and open for meds     Stephanie Preston is a 34 year old female new to our office today for initiation of medical weight loss program.  Patient presents today with c/o excess weight. Referred by, friend     Has been struggling weight gain after the birth of children (last one was 3 yrs ago)  Started working out in feb 2024 (has lost about #20 lbs)   Admits to night time cravings   Has questions about phentermine    Denies chest pain, shortness of breath, dizziness, blurred vision, headache, paresthesia, nausea/vomiting.     Reason/goal for weight loss: to be at or close to my normal weight range in 6 months and be at my healthiest weight and keeping it off in 1 year   Triggers for weight gain? Stress  Previous weight loss programs? NO  Previous weight loss medications?  none    Reviewed [facility] patient contract: Readiness for Lifestyle change: 10/10, Interest in Medication: 10/10, Bariatric surgery interest: 0/10    Weight  Starting weight: 203  Max weight: 230  Lowest weight: 155    Wt Readings from Last 6 Encounters:   04/03/24 203 lb (92.1 kg)   03/21/24 210 lb (95.3 kg)   10/09/23 220 lb (99.8 kg)   09/28/23 220 lb (99.8 kg)   02/12/21 217 lb (98.4 kg)   01/14/21 218 lb (98.9 kg)        Typical diet   Breakfast Lunch Dinner Snacks Fluids     Avocado toast with egg and hasbrown      Spniach artichoke dip and tortilla chips     Yogurt, avocado toast, fruit  Water: adequate   Soda:  Juice:  Coffee/Tea: coffee with almond milk      Portion: medium  Eats 3 meals per day: (only eating 2 meals per day)  Number of restaurant or fast food meals/week: 2 meals/week    Social hx and lifestyle reviewed:    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   Marital status: single, 3 kids (7, 5, and 3 yrs)  Support: yes  Tobacco use: none  ETOH use: 2  per/week  Supplements: none  Exercise: 4 days per week-  walking, lifting weights  Stress level: 3/10  Sleep hours and integrity: 7 Hours per night    MEDICAL HISTORY  PMH reviewed:   Cardiac disorders:negative   Depression/anxiety: negative  Glaucoma: negative  Kidney stones: negative  Eating disorder: negative  Migraines/seizures: negative  Joint-related conditions:negative  Liver disease: negative  Thyroid disease: negative  Constipation: negative  Diabetes: negative  Sleep Apnea hx: n/a  Cancer hx: negative  DVT: negative  Family or personal history of Pancreatic issues / Medullary Thyroid Cancer: negative  History of bariatric surgery: negative    FMH reviewed: obesity in parent/s or sibling: brother     REVIEW OF SYSTEMS  GENERAL: feels well otherwise, and negative fatigue   SKIN: denies any rashes to skin folds  HEENT: snoring- no; denies neck thickening  LUNGS: denies shortness of breath with exertion, no apnea  CARDIOVASCULAR: denies chest pain on exertion, denies palpitations or pedal edema  GI: denies abdominal pain, distention, No N/V/D/C  MUSCULOSKELETAL: denies back pain, joint pains  NEURO: denies headaches or dizziness  ENDOCRINE: denies any excess hunger, urination or thirst, denies any purple striae  PSYCH: denies change in behavior or mood, denies feeling sad or depressed    EXAM  /82   Pulse 84   Resp 16   Ht 5' 4\" (1.626 m)   Wt 203 lb (92.1 kg)   LMP 04/01/2024 (Exact Date)   BMI 34.84 kg/m² , Percent body fat: F >32% Female 40.1%;  visceral fat 9 Muscle Mass: 30.4 lbs%       GENERAL: well developed, well nourished, in no apparent distress  SKIN: warm, pink, dry without rashes to exposed area   EYES: conjunctiva pink, sclera non icteric, PERRLA  HEENT: atraumatic, normocephalic, O/p: Mallampati score- 2  NECK: supple, non tender, no adenopathy, no thyromegaly  LUNGS: CTA in all fields, breathing non labored  CARDIO: RRR without murmur, normal S1 and S2 without clicks or gallops, no pedal edema  GI: +BS, soft, no masses, HSM or  tenderness  EXTREMITIES: grossly intact  NEURO: Oriented times three, full ROM of bilateral UE/LE  PSYCH: pleasant, cooperative, normal mood and affect    Lab Results   Component Value Date     (H) 10/05/2023    BUN 9 10/05/2023    CREATSERUM 0.76 10/05/2023    ANIONGAP 6 10/05/2023    CA 8.8 10/05/2023    OSMOCALC 285 10/05/2023    ALKPHO 84 10/05/2023    AST 17 10/05/2023    ALT 27 10/05/2023    BILT 0.4 10/05/2023    TP 7.5 10/05/2023    ALB 3.6 10/05/2023    GLOBULIN 3.9 10/05/2023     10/05/2023    K 4.4 10/05/2023     10/05/2023    CO2 24.0 10/05/2023     No results found for: \"EAG\", \"A1C\"  Lab Results   Component Value Date    CHOLEST 145 10/05/2023    TRIG 105 10/05/2023    HDL 51 10/05/2023    LDL 75 10/05/2023    VLDL 16 10/05/2023    NONHDLC 94 10/05/2023     Lab Results   Component Value Date    B12 329 10/05/2023     Lab Results   Component Value Date    VITD 21.7 (L) 10/05/2023       No current outpatient medications on file prior to visit.     No current facility-administered medications on file prior to visit.       ASSESSMENT/PLAN    ICD-10-CM    1. Therapeutic drug monitoring  Z51.81 EKG 12 Lead performed at [facility]     OP REFERRAL TO [facility] ([facility] USE ONLY)      2. Obesity (BMI 30-39.9)  E66.9 EKG 12 Lead performed at [facility]     OP REFERRAL TO DIETITIAN [facility] ([facility] USE ONLY)        Initial Weight Data and Goal Weight Loss:  Weight Calculations  Initial Weight: 203 lbs  Initial Weight Date: 04/03/24  Today's Weight: 203 lbs  5% Goal: 10.15  10% Goal: 20.3  Total Weight Loss: 0 lbs  Initial consult: 203 lbs on 4/3/2024, Down  Lb:  lbs total     PLAN   Will hold on start medications: if EKG is normal we can do phentermine  --advised of side effects and adverse effects of this medication  Contradictions: needs EKG   Body composition test results given   Reviewed labs, outpatient EKG ordered   Continue with vitamin d OTC  Will trial vitamin b12  OTC  Decrease carbs, increase protein, no skipping meals   Needs to incorporate exercise regimen FITTE: ACSM recommendations (150-300 minutes/ week in active weight loss)   Follow up with dietitian and psychologist as recommended.  Discussed the role of sleep and stress in weight management.  Counseled on comprehensive weight loss plan including attention to nutrition, exercise and behavior/stress management for success. See patient instruction below for more details.    Total time spent on chart review, pre-charting, obtaining history, counseling, and educating, reviewing labs was 50 minutes.       NOTE TO PATIENT: The 21st Century Cures Act makes clinical notes like these available to patients in the interest of transparency. Clinical notes are medical documents used by physicians and care providers to communicate with each other. These documents include medical language and terminology, abbreviations, and treatment information that may sound technical and at times possibly unfamiliar. In addition, at times, the verbiage may appear blunt or direct. These documents are one tool providers use to communicate relevant information and clinical opinions of the care providers in a way that allows common understanding of the clinical context.     Weight Loss Contract reviewed and signed today 4/3/2024    Patient Instructions   Welcome to the [facility]!!  Thank you for placing your trust in our health care team, I look forward to working with you along this journey to better health!  Next steps:     1.  Schedule a personal nutrition consultation with one of our registered dieticians. Bring along your food journal (3 days minimum). See journal options below.  2.  get EKG done     Please try to work on the following dietary changes this first month:  Daily protein recommendation to start:  grams  Daily carbohydrate: <120g  Daily calories: 1,400-1,500  1.  Drink water with meals and  throughout the day, cut down on soda and/or juice if consumed. Consider flavored water options like Bubbly, Spindrift, Hint and Gabe.  2.  Eat breakfast daily and focus on having protein with each meal, examples include: greek yogurt, cottage cheese, hard boiled egg, whole grain toast with peanut butter.   3.  Reduce refined carbohydrates and sugars which includes items such as sweets, as well as rice, pasta, and bread and make sure to choose whole grain options when having them with just 1 serving per meal about the size of your inner palm.  4.  Consume non starchy veggies daily working towards making them a good 50% of your daily food intake. Add them to lunch and dinner consistently.  5.  Optional can start a daily probiotic: VSL#3, (order on line at www.vsl3.com). Take 1 capsule daily with water for 30 days, then reduce to 1 every other day (this will reduce the cost). Capsules can be left out for 2 weeks, but then must be refrigerated.      Please download fransisco My Fitness Pal, LoseIt! Or Net Diary to monitor daily dietary intake and you will be able to see if you are eating the right amount of calories or too much or too little which would hinder weight loss. Additionally this will help to see your daily carbohydrate and protein intake. When you set the fransisco up choose 1-2 lbs/week as a goal.  Keeping a paper food journal is an option as well to remain accountable for your choices- this is the start to mindful eating! A low calorie diet has been consistently shown to support weight loss.     Continue or start exercising to help establish a routine. If not already exercising begin with 1 day and progress as able with long-term goal of 30 minutes 5 days a week at a minimum.     Meditation daily can help manage and control stress. Chronic stress can make weight loss difficult.  Exercising is one way to help with stress, but meditation using the CALM Fransisco or another comparable alternative can be done in your home or  place of work with little time commitment. This Fransisco can also help work on behavior change and improve sleep. Check out the segment under Calm Masterclass and listen to The 4 Pillars of Health. A great way to begin learning about the foundation of lifestyle with practical tips to use in your every day.     Check out www.yourweightmatters.org blog for continued daily support and education along this weight loss journey!    Patient Resources:     Personal Training/Fitness Classes/Health Coaching     [facility] @ [email or web address] Full fitness center with group fitness and personal training. Discount available as client of [facility].  Health Coaching and Personal Training with Jocelyne Estes at our [facility]- individual weekly coaching with option to add personal training and small group fitness classes targeted at weight loss- [phone] and/or email @ [email or web address]  [employer] [place] [email or web address] Fitness [phone] and/or email Joan at [email or web address]  [facility] with multiple locations: Geddit (www.[employer]), [facility] The Circadence (www.Clarity Software Solutions), MIKA Audio (www.ScaleBase), The Exercise  (www.exercisecoach.Sysomos)     Online Fitness  Fitness  on Utube  Fit in 10 DVD series- www.agxcu37QHA.com  Sit and Be Fit - Chair exercise series Www.sitandbefit.org  Hip Hop Fit with Cesar Todd at www.hiphopfit.net     Apps for on the Go Fitness  [place] 7 Minute Workout (orange box with white 7) - free on the go HIIT training fransisco  Peloton Fransisco @ www.onepeloton.com     Nutrition Trackers and Tools  LoseIT! And My Fitness Pal apps and on line for tracking nutrition  NOOM - virtual health coaching  FitFoundation (healthy meals on the go) in [place] @ www.qgaydbzzssgqs9m.Sysomos  Young GOFF @ www.bisrayomterrie"[employer]  and Jwttlk56 (keto and low carb plans recommended) @ www.mwygft24.com, Metabolic Meals @ www.MyMetabolicMeals.com - individual prepared meals to go  Gobble, Blue Apron, Home , Every Plate, Sunbasket- on line meal delivery programs for preparation at home  [place] in [place] for homemade meals to go @ www.mealstickKllUni-Power Group.com  Diet Doctor @ www.dietdoctor.com - low carb swaps  YummExos - meal prep and planning nadeen (www.yummly.com)     Stress Management/Behavior/Mindful Eating  CALM meditation nadeen (www.calm.com)  Headspace  Am I Hungry? Mindful eating virtual  nadeen  Www.your5appers.org - Obesity Action Coalition sponsored Blog posts daily  Motivation nadeen (black box with white \")- daily supportive messages sent to your phone     Books/Video Education/Podcasts  Mindless Eating by Rui Stephens  Why We Get Sick by Garry Alegria (a book about insulin resistance)  Atomic Habits by Royce Myrick (a book about taking small steps to promote greater behavior change)   Can't Hurt Me by Flako Arias (a book exploring the power of discipline in achieving your goals)  The End of Dieting: How to Live for Life by Dr. Evelio Canada M.D. or listen to The BizArk Podcast Episode 63: Understanding \"Nutritarian\" Eating w/Dr. Evelio Canada  Your Body in Balance: The New Science of Food, Hormones, and Health by Dr. Enrique Smith  The Menopause Diet Plan by Aubrie Evans and Kim Nunes  The Complete Guide to fasting by Dr. Loera  Sugar, Salt & Fat by Rody Roca, Ph.D, R.D.  Weight Loss Surgery Will Not Treat Food Addiction by Paige Zavala Ph.D  The Game Changers- Netflix Documentary on plant based nutrition  Fed Up - documentary about obesity (Free on Utube)  The Truth About Sugar - documentary on sugar (Free on Utube, https://youtu.be/8P8kopxCZ6l)  The Dr. Young T5 Wellness Plan by Dr. Joel Young MD  Fitlosophy Fitspiration - journal to better health (found at [employer] in fitness aisle)  What Happened to  You?- a look at the impact trauma has on behavior written by Edgar Rhodes and Dr. Leonardo Mills  Whole Again by Colby Miranda - discovering your true self after trauma  Lavon Ramachandran talk on Rizzoma, The Call to [employer]  Podcasts: The Exam Room by the Physician's Committee, Nutrition Facts by Dr. Gates    We are here to support you with weight loss, but please remember that you still need your primary care provider for your routine health maintenance.      No follow-ups on file.    Patient verbalizes understanding.    Christi Cortez, APRN

## 2024-04-05 ENCOUNTER — EKG ENCOUNTER (OUTPATIENT)
Dept: LAB | Age: 34
End: 2024-04-05
Attending: NURSE PRACTITIONER
Payer: COMMERCIAL

## 2024-04-05 DIAGNOSIS — Z51.81 THERAPEUTIC DRUG MONITORING: ICD-10-CM

## 2024-04-05 DIAGNOSIS — E66.9 OBESITY (BMI 30-39.9): ICD-10-CM

## 2024-04-05 LAB
ATRIAL RATE: 60 BPM
P AXIS: 36 DEGREES
P-R INTERVAL: 130 MS
Q-T INTERVAL: 410 MS
QRS DURATION: 94 MS
QTC CALCULATION (BEZET): 410 MS
R AXIS: 32 DEGREES
T AXIS: 41 DEGREES
VENTRICULAR RATE: 60 BPM

## 2024-04-05 PROCEDURE — 93010 ELECTROCARDIOGRAM REPORT: CPT | Performed by: INTERNAL MEDICINE

## 2024-04-05 PROCEDURE — 93005 ELECTROCARDIOGRAM TRACING: CPT

## 2024-04-07 ENCOUNTER — PATIENT MESSAGE (OUTPATIENT)
Dept: INTERNAL MEDICINE CLINIC | Facility: CLINIC | Age: 34
End: 2024-04-07

## 2024-04-07 DIAGNOSIS — E66.9 OBESITY (BMI 30-39.9): Primary | ICD-10-CM

## 2024-04-08 RX ORDER — PHENTERMINE HYDROCHLORIDE 15 MG/1
15 CAPSULE ORAL EVERY MORNING
Qty: 30 CAPSULE | Refills: 2 | Status: SHIPPED | OUTPATIENT
Start: 2024-04-08

## 2024-04-08 NOTE — TELEPHONE ENCOUNTER
From: Stephanie Preston  To: Christi Cortez  Sent: 4/7/2024 9:14 PM CDT  Subject: EKG     Hi! Yes I would like to start the medication, thank you!

## 2024-04-08 NOTE — TELEPHONE ENCOUNTER
Patient responding to her EKG result note    Ms. Preston,  Your outpatient EKG is normal. Did you want to start the medication (ie. Phentermine) like we talked about?  Sincerely, Christi Cortez, MALIK ...

## 2024-07-08 DIAGNOSIS — E66.9 OBESITY (BMI 30-39.9): ICD-10-CM

## 2024-07-08 RX ORDER — PHENTERMINE HYDROCHLORIDE 15 MG/1
15 CAPSULE ORAL EVERY MORNING
Qty: 30 CAPSULE | Refills: 1 | Status: SHIPPED | OUTPATIENT
Start: 2024-07-08

## 2024-07-08 RX ORDER — PHENTERMINE HYDROCHLORIDE 15 MG/1
15 CAPSULE ORAL EVERY MORNING
Qty: 30 CAPSULE | Refills: 0 | OUTPATIENT
Start: 2024-07-08

## 2024-07-08 NOTE — TELEPHONE ENCOUNTER
Requesting   Requested Prescriptions     Pending Prescriptions Disp Refills    Phentermine HCl 15 MG Oral Cap 30 capsule 2     Sig: Take 1 capsule (15 mg total) by mouth every morning.       LOV: 04/03/2024  RTC: in about 3 months  Filled: 04/08/2024 #30 with 2 refills    Future Appointments   Date Time Provider Department Center   8/20/2024  9:40 AM Christi Cortez APRN [facility]

## 2024-08-06 DIAGNOSIS — E66.9 OBESITY (BMI 30-39.9): ICD-10-CM

## 2024-08-06 RX ORDER — PHENTERMINE HYDROCHLORIDE 15 MG/1
15 CAPSULE ORAL EVERY MORNING
Qty: 30 CAPSULE | Refills: 1 | Status: CANCELLED | OUTPATIENT
Start: 2024-08-06

## 2024-08-20 ENCOUNTER — OFFICE VISIT (OUTPATIENT)
Dept: INTERNAL MEDICINE CLINIC | Facility: CLINIC | Age: 34
End: 2024-08-20
Payer: COMMERCIAL

## 2024-08-20 VITALS
HEIGHT: 64 IN | WEIGHT: 185 LBS | RESPIRATION RATE: 16 BRPM | HEART RATE: 102 BPM | SYSTOLIC BLOOD PRESSURE: 110 MMHG | DIASTOLIC BLOOD PRESSURE: 78 MMHG | BODY MASS INDEX: 31.58 KG/M2

## 2024-08-20 DIAGNOSIS — Z51.81 THERAPEUTIC DRUG MONITORING: Primary | ICD-10-CM

## 2024-08-20 DIAGNOSIS — E66.9 OBESITY (BMI 30-39.9): ICD-10-CM

## 2024-08-20 PROCEDURE — 3008F BODY MASS INDEX DOCD: CPT | Performed by: NURSE PRACTITIONER

## 2024-08-20 PROCEDURE — 99213 OFFICE O/P EST LOW 20 MIN: CPT | Performed by: NURSE PRACTITIONER

## 2024-08-20 PROCEDURE — 3074F SYST BP LT 130 MM HG: CPT | Performed by: NURSE PRACTITIONER

## 2024-08-20 PROCEDURE — 3078F DIAST BP <80 MM HG: CPT | Performed by: NURSE PRACTITIONER

## 2024-08-20 RX ORDER — PHENTERMINE HYDROCHLORIDE 15 MG/1
15 CAPSULE ORAL EVERY MORNING
Qty: 30 CAPSULE | Refills: 2 | Status: SHIPPED | OUTPATIENT
Start: 2024-08-20

## 2024-08-20 NOTE — PROGRESS NOTES
HISTORY OF PRESENT ILLNESS  Chief Complaint   Patient presents with    Weight Check     -20     Stephanie Preston is a 34 year old female here for follow up with medical weight loss program for the treatment of overweight, obesity, or morbid obesity.     Down 20 lbs (first month f/u from 4/2024)   Compliant on phentermine 15mg   Tolerating well, helping with decreasing appetite and no side effects     Has been at the same weight for the past 2 weeks  Exercise has been lacking due to lack of time (just sold house)   Exercise/Activity: 2x/ week, via walking, not doing anything routine as far as exercise  Nutrition: eating regular meals, +protein, minimal veggies. not tracking reports  Meals out per week on average: 1-2  Stress is manageable   Sleep: 7-8 hours/night, waking up feeling rested    Denies chest pain, shortness of breath, dizziness, blurred vision, headache, paresthesia, nausea/vomiting.     Breakfast Lunch Dinner Snacks Fluids   Reviewed              Wt Readings from Last 6 Encounters:   08/20/24 185 lb (83.9 kg)   04/03/24 203 lb (92.1 kg)   03/21/24 210 lb (95.3 kg)   10/09/23 220 lb (99.8 kg)   09/28/23 220 lb (99.8 kg)   02/12/21 217 lb (98.4 kg)          REVIEW OF SYSTEMS  GENERAL: feels well otherwise, denied any fevers chills or night sweats   LUNGS: denies shortness of breath  CARDIOVASCULAR: denies chest pain  GI: denies abdominal pain  MUSCULOSKELETAL: denies back pain, joint pains   PSYCH: denies change in behavior or mood, denies feeling sad or depressed    EXAM  /78   Pulse 102   Resp 16   Ht 5' 4\" (1.626 m)   Wt 185 lb (83.9 kg)   LMP 08/15/2024 (Exact Date)   BMI 31.76 kg/m²       GENERAL: well developed, well nourished, in no apparent distress, A/O x3  SKIN: no rashes, no suspicious lesions  HEENT: atraumatic, normocephalic, OP-clear, PERRLA  NECK: supple, no adenopathy  LUNGS: CTA in all fields, breathing non labored  CARDIO: RRR without murmur  GI: +BS, NT/ND, no masses or  HSM  EXTREMITIES: no cyanosis, no clubbing, no edema    Lab Results   Component Value Date     (H) 10/05/2023    BUN 9 10/05/2023    CREATSERUM 0.76 10/05/2023    ANIONGAP 6 10/05/2023    CA 8.8 10/05/2023    OSMOCALC 285 10/05/2023    ALKPHO 84 10/05/2023    AST 17 10/05/2023    ALT 27 10/05/2023    BILT 0.4 10/05/2023    TP 7.5 10/05/2023    ALB 3.6 10/05/2023    GLOBULIN 3.9 10/05/2023     10/05/2023    K 4.4 10/05/2023     10/05/2023    CO2 24.0 10/05/2023     No results found for: \"EAG\", \"A1C\"  Lab Results   Component Value Date    CHOLEST 145 10/05/2023    TRIG 105 10/05/2023    HDL 51 10/05/2023    LDL 75 10/05/2023    VLDL 16 10/05/2023    NONHDLC 94 10/05/2023     Lab Results   Component Value Date    B12 329 10/05/2023     Lab Results   Component Value Date    VITD 21.7 (L) 10/05/2023       Current Outpatient Medications on File Prior to Visit   Medication Sig Dispense Refill    Phentermine HCl 15 MG Oral Cap Take 1 capsule (15 mg total) by mouth every morning. 30 capsule 1     No current facility-administered medications on file prior to visit.       ASSESSMENT/PLAN    ICD-10-CM    1. Therapeutic drug monitoring  Z51.81       2. Obesity (BMI 30-39.9)  E66.9           PLAN   Initial Weight Data and Goal Weight Loss:  Initial consult: #203 lbs on 4/2024  Weight Calculations  Initial Weight: 203 lbs  Initial Weight Date: 04/03/24  Today's Weight: 185 lbs  5% Goal: 10.15  10% Goal: 20.3  Total Weight Loss: 18 lbs  Total weight loss: Down 20 lbs total, Net loss 18 lbs  Continue with medications: phentermine 15mg (will stay at this dosage)   --advised of side effects and adverse effects of this medication  Contradictions: none  Reviewed labs   Continue with vitamin d OTC  Continue with vitamin b12 OTC  Wrote out new macros and encouraged tracking food   Nutrition: Low carb diet, recommended to eat breakfast daily/ regular protein intake  Follow up with dietitian and psychologist as  recommended.  Discussed the role of sleep and stress in weight management.  Counseled on comprehensive weight loss plan including attention to nutrition, exercise and behavior/stress management for success. See patient instruction below for more details.  Discussed strategies to overcome barriers to successful weight loss and weight maintenance  FITTE: ACSM recommendations (150-300 minutes/ week in active weight loss)   Weight Loss Consent to treat reviewed and signed.    Total time spent on chart review, pre-charting, obtaining history, counseling, and educating, reviewing labs was 25 minutes.       NOTE TO PATIENT: The 21st Century Cures Act makes clinical notes like these available to patients in the interest of transparency. Clinical notes are medical documents used by physicians and care providers to communicate with each other. These documents include medical language and terminology, abbreviations, and treatment information that may sound technical and at times possibly unfamiliar. In addition, at times, the verbiage may appear blunt or direct. These documents are one tool providers use to communicate relevant information and clinical opinions of the care providers in a way that allows common understanding of the clinical context.     There are no Patient Instructions on file for this visit.    No follow-ups on file.    Patient verbalizes understanding.    Christi Cortez, APRN

## 2024-08-20 NOTE — PATIENT INSTRUCTIONS
Body aches and HA, nausea, fever. No cough/ sore throat. Ibuprofen around 4 hours ago.  Mask applied at triage Next steps:  1.  Fill your prescribed medication and take as discussed and prescribed: phentermine 15mg  2.  Schedule a personal nutrition consultation with one of our registered dieticians     Please try to work on the following dietary changes:  Daily protein recommendation to start:  grams  Daily carbohydrate: <115g  Daily calories: 1,300-1,400  1.  Drink water with meals and throughout the day, cut down on soda and/or juice if consumed. Consider flavored water options like Bubbly, Spindrift, Hint and Gabe.  2.  Eat breakfast daily and focus on having protein with each meal, examples include: greek yogurt, cottage cheese, hard boiled egg, whole grain toast with peanut butter.   3.  Reduce refined carbohydrates and sugars which includes items such as sweets, as well as rice, pasta, and bread and make sure to choose whole grain options when having them with just 1 serving per meal about the size of your inner palm.  4.  Consume non starchy veggies daily working towards making them a good 50% of your daily food intake. Add them to lunch and dinner consistently.  5.  Start a daily probiotic: VSL#3 is recommended, (order on line at www.vsl3.com). Take 1 capsule daily with water for 30 days, then reduce to 1 every other day (this will reduce the cost). Capsules can be left out for 2 weeks, but then must be refrigerated.      Please download nadeen My Fitness Pal, LoseIt! Or Net Diary to monitor daily dietary intake and you will be able to see if you are eating the right amount of calories or too much or too little which would hinder weight loss. Additionally this will help to see your daily carbohydrate and protein intake. When you set the nadeen up choose 1-2 lbs/week as a goal.  Keeping a paper food journal is an option as well to remain accountable for your choices- this is the start to mindful eating! A low calorie diet has been consistently shown to support weight loss.     Continue or start exercising to help  establish a routine. If not already exercising begin with 1 day and progress as able with long-term goal of 30 minutes 5 days a week at a minimum.     Meditation daily can help manage and control stress. Chronic stress can make weight loss difficult.  Exercising is one way to help with stress, but meditation using the CALM Fransisco or another comparable alternative can be done in your home or place of work with little time commitment. This Fransisco can also help work on behavior change and improve sleep. Check out the segment under Calm Masterclass and listen to The 4 Pillars of Health. A great way to begin learning about the foundation of lifestyle with practical tips to use in your every day.     Check out www.yourweightmatters.org blog for continued daily support and education along this weight loss journey!    Patient Resources:     Personal Training/Fitness Classes/Health Coaching     [facility] @ [email or web address]/healthy-driven/fitness-center Full fitness center with group fitness and personal training. Discount available as client of [facility].  Health Coaching and Personal Training with Jocelyne Estes at our [place] [facility]- individual weekly coaching with option to add personal training and small group fitness classes targeted at weight loss- [phone] and/or email [email or web address]  360FIT [place] [email or web address]. Group Fitness [phone] and/or email Joan at [email or web address]  FrancOste[facility] Islanded Fitness Centers with multiple locations: Snowshoefood (www.Dragonfly Systems), Eat The Conject Fitness (www.Diagnostic Biochips.CSR), Fit Body Bootcamp (www.YoolinkbodybootShanghai Yinzuo Haiya Automotive Electronicsp.CSR), DermTech International (www.artandseek.CSR), The Exercise  (www.exercisecoach.CSR)     Online Fitness  Fitness  on Utube  Fit in 10 DVD series- www.rgowq71QBZ.com  Sit and Be Fit - Chair exercise series Www.sitandbefit.org  Hip Hop  Fit with Gene Todd at www.hiphopfit.net     Apps for on the Go Fitness  [place] 7 Minute Workout (orange box with white 7) - free on the go HIIT training fransisco  Peloton Fransisco @ www.onepeloton.com     Nutrition Trackers and Tools  LoseIT! And My Fitness Pal apps and on line for tracking nutrition  NOOM - virtual health coaching  FitFoundation (healthy meals on the go) in [place] @ www.cyqpumhxqidga6r.Verisante Technology  Bistro MD @ Civitas Therapeuticsbistromd.com and Whokyd50 (keto and low carb plans recommended) @ www.iltcxr15.com, Metabolic Meals @ www.MyMetabolicMeals.com - individual prepared meals to go  Gobble, Blue Apron, Home , Every Plate, Sunbasket- on line meal delivery programs for preparation at home  Meal Village in [place] for homemade meals to go @ www.mealvillage.Verisante Technology  Diet Doctor @ www.dietdoctor.Verisante Technology - low carb swaps  YuGenesis Operating System - meal prep and planning fransisco (www.yummly.com)     Stress Management/Behavior/Mindful Eating  CALM meditation fransisco (www.calm.com)  Headspace  Am I Hungry? Mindful eating virtual  fransisco  Www.yourweightmatters.org - Obesity Action Coalition sponsored Blog posts daily  Motivation fransisco (black box with white \")- daily supportive messages sent to your phone     Books/Video Education/Podcasts  Mindless Eating by Rui Stephens  Why We Get Sick by Garry Alegria (a book about insulin resistance)  Atomic Habits by Royce Myrick (a book about taking small steps to promote greater behavior change)   Can't Hurt Me by Flako Arias (a book exploring the power of discipline in achieving your goals)  The End of Dieting: How to Live for Life by Dr. Evelio Canada M.D. or listen to The Impulsonic Podcast Episode 63: Understanding \"Nutritarian\" Eating w/Dr. Evelio Canada  Your Body in Balance: The New Science of Food, Hormones, and Health by Dr. Enrique Smith  The Menopause Diet Plan by Aubrie Evans and Kim Nunes  The Complete Guide to fasting by Dr. Loera  Sugar, Salt & Fat by Rody Roca, Ph.D, R.D.  Weight Loss  Surgery Will Not Treat Food Addiction by Paige Zavala Ph.D  The Game Changers- Ceram Hydix Documentary on plant based nutrition  Fed Up - documentary about obesity (Free on Utube)  The Truth About Sugar - documentary on sugar (Free on Utube, https://youImmuneticsu.be/0Z6voxzGW3g)  The Dr. Young T5 Wellness Plan by Dr. Joel Young MD  Fitlosophy Fitspiration - journal to better health (found at [employer] in fitness aisle)  What Happened to You?- a look at the impact trauma has on behavior written by Edgar Rhodes and Dr. Leonardo Mills  Whole Again by Colby Miranda - discovering your true self after trauma  Lavon Ramachandran talk on [employer], The Call to Courage  Podcasts: The Exam Room by the Physician's Committee, Nutrition Facts by Dr. Gates    We are here to support you with weight loss, but please remember that you still need your primary care provider for your routine health maintenance.

## 2024-08-23 ENCOUNTER — E-VISIT (OUTPATIENT)
Dept: TELEHEALTH | Age: 34
End: 2024-08-23
Payer: COMMERCIAL

## 2024-08-23 DIAGNOSIS — R35.0 URINARY FREQUENCY: Primary | ICD-10-CM

## 2024-08-23 NOTE — PROGRESS NOTES
Stephanie Preston is a 34 year old female.  HPI:   See answers to questions above.     Current Outpatient Medications   Medication Sig Dispense Refill    Phentermine HCl 15 MG Oral Cap Take 1 capsule (15 mg total) by mouth every morning. 30 capsule 2      Past Medical History:    Anesthesia complication    PONV (postoperative nausea and vomiting)      Past Surgical History:   Procedure Laterality Date            2020    with bilateral salpingectomy      Family History   Problem Relation Age of Onset    Diabetes Mother     Breast Cancer Mother 49    Diabetes Father     Hypertension Father       Social History:  Social History     Socioeconomic History    Marital status: Single   Tobacco Use    Smoking status: Never     Passive exposure: Never    Smokeless tobacco: Never   Vaping Use    Vaping status: Never Used   Substance and Sexual Activity    Alcohol use: Yes     Alcohol/week: 2.0 standard drinks of alcohol     Types: 2 Standard drinks or equivalent per week     Comment: Socially    Drug use: Not Currently    Sexual activity: Not Currently     Partners: Male   Other Topics Concern    Caffeine Concern Yes    Exercise Yes    Seat Belt Yes         ASSESSMENT AND PLAN:     Encounter Diagnosis   Name Primary?    Urinary frequency Yes       U/A and urine culture tests ordered.    Meds & Refills for this Visit:  Requested Prescriptions      No prescriptions requested or ordered in this encounter       Duration of  the service:  10 minutes    Patient advised to follow up with PCP if no improvement or worsening of symptoms  Refer to Paradigm Holdingst message for specific patient instructions

## 2024-12-17 DIAGNOSIS — E66.9 OBESITY (BMI 30-39.9): ICD-10-CM

## 2024-12-17 RX ORDER — PHENTERMINE HYDROCHLORIDE 15 MG/1
15 CAPSULE ORAL EVERY MORNING
Qty: 30 CAPSULE | Refills: 0 | Status: SHIPPED | OUTPATIENT
Start: 2024-12-17

## 2024-12-17 NOTE — TELEPHONE ENCOUNTER
Requesting   Requested Prescriptions     Pending Prescriptions Disp Refills    Phentermine HCl 15 MG Oral Cap 30 capsule 2     Sig: Take 1 capsule (15 mg total) by mouth every morning.       LOV: 08/20/2024  RTC: in about 5 months  Filled: 08/20/2024 #30 with 2 refills    Future Appointments   Date Time Provider Department Center   1/10/2025 11:40 AM Christi Cortez APRN [facility]   5/6/2025  9:40 AM Christi Cortez APRN [facility]   9/16/2025  9:40 AM Christi Cortez APRN [facility]

## 2025-01-10 ENCOUNTER — TELEMEDICINE (OUTPATIENT)
Dept: INTERNAL MEDICINE CLINIC | Facility: CLINIC | Age: 35
End: 2025-01-10
Payer: COMMERCIAL

## 2025-01-10 DIAGNOSIS — Z51.81 THERAPEUTIC DRUG MONITORING: Primary | ICD-10-CM

## 2025-01-10 DIAGNOSIS — E66.9 OBESITY (BMI 30-39.9): ICD-10-CM

## 2025-01-10 RX ORDER — PHENTERMINE HYDROCHLORIDE 15 MG/1
15 CAPSULE ORAL EVERY MORNING
Qty: 90 CAPSULE | Refills: 1 | Status: SHIPPED | OUTPATIENT
Start: 2025-01-10

## 2025-01-10 NOTE — PROGRESS NOTES
[facility] WEIGHT MANAGEMENT VIRTUAL ENCOUNTER     Stephanie Preston verbally consents to a Virtual/Telephone Check-In service on 01/10/25   Patient understands and accepts financial responsibility for any deductible, co-insurance and/or co-pays associated with this service.    HISTORY OF PRESENT ILLNESS  Chief Complaint   Patient presents with    Other     F/u on weight management      Stephanie Preston is a 34 year old female is being evaluated as a video visit using Telemedicine with live, interactive video and audio    Weight gain/loss since LOV based on home monitoring:   Home scale: #169   Has lost  #16 lbs since LOV 5 months ago     Compliance with medication: phentermine 15mg   Tolerating well, helping with decreasing appetite and no side effects     Admits that she hasn't been doing as  much activity (during the holidays) - was previously doing 3-4 times per week and is down to 1-2 times per week   Has been trying to get in protein, water  Exercise/Activity: 1-2x/ week, via walking, not doing anything routine as far as exercise  Nutrition: eating regular meals, +protein, minimal veggies. not tracking reports  Stress is manageable- 8/10  Sleep: 7-8 hours/night, waking up feeling rested    Denies chest pain, shortness of breath, dizziness, blurred vision, headache, paresthesia, nausea/vomiting.     Wt Readings from Last 6 Encounters:   08/20/24 185 lb (83.9 kg)   04/03/24 203 lb (92.1 kg)   03/21/24 210 lb (95.3 kg)   10/09/23 220 lb (99.8 kg)   09/28/23 220 lb (99.8 kg)   02/12/21 217 lb (98.4 kg)          Subjective  REVIEW OF SYSTEMS  GENERAL HEALTH: feels well otherwise, denied any fevers chills or night sweats   RESPIRATORY: denies shortness of breath   CARDIOVASCULAR: denies chest pain  GI: denies abdominal pain  PSYCH: denies any mood changes    Objective  EXAM  Reviewed most recent set of vitals   Physical Exam:  GENERAL: well developed, well nourished, in no apparent distress, speaking in full  sentences comfortably   SKIN: warm, pink, dry without rashes to exposed area   EYES: conjunctiva pink  HEENT: atraumatic, normocephalic  LUNGS: normal work of breathing, non labored  CARDIO: normal work, no exertion  EXTREMITIES: no cyanosis, no clubbing, no edema  NEURO: Oriented times three  PSYCH: pleasant, cooperative, normal mood and affect    Lab Results   Component Value Date    WBC 6.6 10/05/2023    RBC 4.66 10/05/2023    HGB 12.6 10/05/2023    HCT 39.9 10/05/2023    MCV 85.6 10/05/2023    MCH 27.0 10/05/2023    MCHC 31.6 10/05/2023    RDW 14.9 10/05/2023    .0 10/05/2023     Lab Results   Component Value Date     (H) 10/05/2023    BUN 9 10/05/2023    CREATSERUM 0.76 10/05/2023    ANIONGAP 6 10/05/2023    CA 8.8 10/05/2023    OSMOCALC 285 10/05/2023    ALKPHO 84 10/05/2023    AST 17 10/05/2023    ALT 27 10/05/2023    BILT 0.4 10/05/2023    TP 7.5 10/05/2023    ALB 3.6 10/05/2023    GLOBULIN 3.9 10/05/2023     10/05/2023    K 4.4 10/05/2023     10/05/2023    CO2 24.0 10/05/2023     No results found for: \"EAG\", \"A1C\"  Lab Results   Component Value Date    CHOLEST 145 10/05/2023    TRIG 105 10/05/2023    HDL 51 10/05/2023    LDL 75 10/05/2023    VLDL 16 10/05/2023    NONHDLC 94 10/05/2023     Lab Results   Component Value Date    T4F 0.9 10/05/2023    TSH 1.150 10/05/2023     Lab Results   Component Value Date    B12 329 10/05/2023     Lab Results   Component Value Date    VITD 21.7 (L) 10/05/2023       Medications Ordered Prior to Encounter[1]    ASSESSMENT  Analyzed weight data:       Diagnoses and all orders for this visit:    Therapeutic drug monitoring    Obesity (BMI 30-39.9)        PLAN  Continue with medications:  phentermine 15mg (will stay at this dosage)   --advised of side effects and adverse effects of this medication  Contradictions: none  Reviewed labs   Continue with vitamin d OTC  Continue with vitamin b12 OTC  Wrote out new macros and encouraged tracking food    Advised to monitor blood pressure and pulse at home/ given parameters to review and contact provider.  Nutrition: low carb diet/ recommended to eat breakfast daily/ regular protein intake  Follow up with dietitian and psychologist as recommended.  Discussed the role of sleep and stress in weight management.  Counseled on comprehensive weight loss plan including attention to nutrition, exercise and behavior/stress management for success. See patient instruction below for more details.  Discussed strategies to overcome barriers to successful weight loss and weight maintenance  FITTE: ACSM recommendations (150-300 minutes/ week in active weight loss)       There are no Patient Instructions on file for this visit.    No follow-ups on file.    Patient verbalizes understanding.    Total time spent on chart review, pre-charting, obtaining history, counseling, and educating, reviewing labs was 23 minutes.       Pt understands phone/video evaluation is not a substitute for face to face examination or emergency care. Pt advised to go to the ER or call 911 for worsening symptoms or acute distress.       Please note that the following visit was completed using two-way, real-time interactive audio and/or video communication.  This has been done in good roseanne to provide continuity of care in the best interest of the provider-patient relationship, due to the ongoing public health crisis/national emergency and because of restrictions of visitation.  There are limitations of this visit as no physical exam could be performed.  Every conscious effort was taken to allow for sufficient and adequate time.  This billing was spent on reviewing labs, medications, radiology tests and decision making.  Appropriate medical decision-making and tests are ordered as detailed in the plan of care above.     NOTE TO PATIENT: The 21st Century Cures Act makes clinical notes like these available to patients in the interest of transparency. Clinical  notes are medical documents used by physicians and care providers to communicate with each other. These documents include medical language and terminology, abbreviations, and treatment information that may sound technical and at times possibly unfamiliar. In addition, at times, the verbiage may appear blunt or direct. These documents are one tool providers use to communicate relevant information and clinical opinions of the care providers in a way that allows common understanding of the clinical context.     Christi Cortez, APRN  1/10/2025         [1]   Current Outpatient Medications on File Prior to Visit   Medication Sig Dispense Refill    Phentermine HCl 15 MG Oral Cap Take 1 capsule (15 mg total) by mouth every morning. 30 capsule 0     No current facility-administered medications on file prior to visit.

## 2025-01-10 NOTE — PATIENT INSTRUCTIONS
Next steps:  1.  Fill your prescribed medication and take as discussed and prescribed: phentermine 15mg  2.  Schedule a personal nutrition consultation with one of our registered dieticians     Please try to work on the following dietary changes:    1.  Drink water with meals and throughout the day, cut down on soda and/or juice if consumed. Consider flavored water options like Bubbly, Spindrift, Hint and Gabe.  2.  Eat breakfast daily and focus on having protein with each meal, examples include: greek yogurt, cottage cheese, hard boiled egg, whole grain toast with peanut butter.   3.  Reduce refined carbohydrates and sugars which includes items such as sweets, as well as rice, pasta, and bread and make sure to choose whole grain options when having them with just 1 serving per meal about the size of your inner palm.  4.  Consume non starchy veggies daily working towards making them a good 50% of your daily food intake. Add them to lunch and dinner consistently.  5.  Start a daily probiotic: VSL#3 is recommended, (order on line at www.vsl3.com). Take 1 capsule daily with water for 30 days, then reduce to 1 every other day (this will reduce the cost). Capsules can be left out for 2 weeks, but then must be refrigerated.      Please download nadeen My Fitness Pal, LoseIt! Or Net Diary to monitor daily dietary intake and you will be able to see if you are eating the right amount of calories or too much or too little which would hinder weight loss. Additionally this will help to see your daily carbohydrate and protein intake. When you set the nadeen up choose 1-2 lbs/week as a goal.  Keeping a paper food journal is an option as well to remain accountable for your choices- this is the start to mindful eating! A low calorie diet has been consistently shown to support weight loss.     Continue or start exercising to help establish a routine. If not already exercising begin with 1 day and progress as able with long-term goal of  30 minutes 5 days a week at a minimum.     Meditation daily can help manage and control stress. Chronic stress can make weight loss difficult.  Exercising is one way to help with stress, but meditation using the CALM Fransisco or another comparable alternative can be done in your home or place of work with little time commitment. This Fransisco can also help work on behavior change and improve sleep. Check out the segment under Calm Masterclass and listen to The 4 Pillars of Health. A great way to begin learning about the foundation of lifestyle with practical tips to use in your every day.     Check out www.yourweightmatters.org blog for continued daily support and education along this weight loss journey!    Patient Resources:     Personal Training/Fitness Classes/Health Coaching     [facility] @ [email or web address]/healthy-driven/fitness-center Full fitness center with group fitness and personal training. Discount available as client of [facility].  Health Coaching and Personal Training with Jocelyne Estes at our [facility]- individual weekly coaching with option to add personal training and small group fitness classes targeted at weight loss- [phone] and/or email @ [email or web address]  360FIT [place] [email or web address]. Group Fitness [phone] and/or email Joan [email or web address]  FrancRhode Island Hospitaled Fitness Centers with multiple locations: Therma-Wave (www.Svelte Medical Systems), Eat The Semmx Fitness (www.Knox Media Hub.Water Innovate), Fit Body Bootcamp (www.[facility]p.Water Innovate), Folkstr (www.Corso), The Exercise  (www.exercisecoach.Water Innovate)     Online Fitness  Fitness  on Utube  Fit in 10 DVD series- www.diukj82BZJ.com  Sit and Be Fit - Chair exercise series Www.sitandbefit.org  Hip Hop Fit with Cesar Todd at www.hiphopfit.net     Apps for on the Go Fitness  [place] 7 Minute Workout (orange box  with white 7) - free on the go HIIT training fransisco  Peloton Fransisco @ www.onepeloton.com     Nutrition Trackers and Tools  LoseIT! And My Fitness Pal apps and on line for tracking nutrition  NOOM - virtual health coaching  FitFoundation (healthy meals on the go) in [place] @ www.nzwlvntuccpbg3i.ESP Systems  Young MD @ www.bistromd.com and Thmpih88 (keto and low carb plans recommended) @ www.ccslmy13.com, Metabolic Meals @ www.MyMetabolicMeals.com - individual prepared meals to go  Gobble, Blue Apron, Home , Every Plate, Sunbasket- on line meal delivery programs for preparation at home  Meal Village in [place] for homemade meals to go @ www.mealSemantic Search Companyllage.ESP Systems  Diet Doctor @ www.dietdoctor.ESP Systems - low carb swaps  Yummly - meal prep and planning fransisco (www.yummly.com)     Stress Management/Behavior/Mindful Eating  CALM meditation fransisco (www.calm.com)  Headspace  Am I Hungry? Mindful eating virtual  fransisco  Www.yourweightmatters.org - Obesity Action Coalition sponsored Blog posts daily  Motivation fransisco (black box with white \")- daily supportive messages sent to your phone     Books/Video Education/Podcasts  Mindless Eating by Rui Stephens  Why We Get Sick by Garry Alegria (a book about insulin resistance)  Atomic Habits by Royce Myrick (a book about taking small steps to promote greater behavior change)   Can't Hurt Me by Flako Arias (a book exploring the power of discipline in achieving your goals)  The End of Dieting: How to Live for Life by Dr. Evelio Canada M.D. or listen to The BabyFirstTV Podcast Episode 63: Understanding \"Nutritarian\" Eating w/Dr. Evelio Canada  Your Body in Balance: The New Science of Food, Hormones, and Health by Dr. Enrique Smith  The Menopause Diet Plan by Aubrie Evans and Kim Nunes  The Complete Guide to fasting by Dr. Loera  Sugar, Salt & Fat by Rody Roca, Ph.D, R.D.  Weight Loss Surgery Will Not Treat Food Addiction by Paige Zavala Ph.D  The Game Changers- Netflix Documentary on  plant based nutrition  Fed Up - documentary about obesity (Free on Utube)  The Truth About Sugar - documentary on sugar (Free on Utube, https://youtu.be/9Y7hsosMY0m)  The Dr. Young T5 Wellness Plan by Dr. Joel Young MD  Fitlosophy Fitspiration - journal to better health (found at [employer] in fitness aisle)  What Happened to You?- a look at the impact trauma has on behavior written by Edgar Rhodes and Dr. Leonardo Mills  Whole Again by Colby Miranda - discovering your true self after trauma  Lavon Ramachandran talk on eBoox, The Call to [employer]  Podcasts: The Exam Room by the Physician's Committee, Nutrition Facts by Dr. Gates    We are here to support you with weight loss, but please remember that you still need your primary care provider for your routine health maintenance.

## 2025-05-06 ENCOUNTER — OFFICE VISIT (OUTPATIENT)
Dept: INTERNAL MEDICINE CLINIC | Facility: CLINIC | Age: 35
End: 2025-05-06
Payer: COMMERCIAL

## 2025-05-06 VITALS
HEART RATE: 96 BPM | BODY MASS INDEX: 29.71 KG/M2 | OXYGEN SATURATION: 99 % | DIASTOLIC BLOOD PRESSURE: 80 MMHG | WEIGHT: 174 LBS | SYSTOLIC BLOOD PRESSURE: 114 MMHG | HEIGHT: 64 IN | RESPIRATION RATE: 18 BRPM

## 2025-05-06 DIAGNOSIS — E66.9 OBESITY (BMI 30-39.9): ICD-10-CM

## 2025-05-06 DIAGNOSIS — Z51.81 THERAPEUTIC DRUG MONITORING: Primary | ICD-10-CM

## 2025-05-06 PROCEDURE — 99214 OFFICE O/P EST MOD 30 MIN: CPT | Performed by: NURSE PRACTITIONER

## 2025-05-06 PROCEDURE — 3008F BODY MASS INDEX DOCD: CPT | Performed by: NURSE PRACTITIONER

## 2025-05-06 PROCEDURE — 3079F DIAST BP 80-89 MM HG: CPT | Performed by: NURSE PRACTITIONER

## 2025-05-06 PROCEDURE — 3074F SYST BP LT 130 MM HG: CPT | Performed by: NURSE PRACTITIONER

## 2025-05-06 RX ORDER — PHENTERMINE HYDROCHLORIDE 30 MG/1
30 CAPSULE ORAL EVERY MORNING
Qty: 30 CAPSULE | Refills: 3 | Status: SHIPPED | OUTPATIENT
Start: 2025-05-06

## 2025-05-06 NOTE — PROGRESS NOTES
HISTORY OF PRESENT ILLNESS  Chief Complaint   Patient presents with    Weight Check     Up 5 lb((1/10/25 tele)     Stephanie Preston is a 35 year old female here for follow up with medical weight loss program for the treatment of overweight, obesity, or morbid obesity.     Up #5 lbs (f/u from 1/2025 via telemedicine)   Compliant on phentermine 15mg   Tolerating well, is not sure helping with decreasing appetite and no side effects     Would ideally to increase phentermine  Has been feeling more cravings for sugar  Will start weight training with son and his  this soon  Exercise/Activity: 1-2x/ week, via walking, not doing anything routine as far as exercise  Nutrition: eating regular meals, +protein, minimal veggies. not tracking reports  Meals out per week on average: 2  Stress is manageable- 9/10   Sleep: 8 hours/night, waking up feeling rested    Denies chest pain, shortness of breath, dizziness, blurred vision, headache, paresthesia, nausea/vomiting.     Breakfast Lunch Dinner Snacks Fluids   Reviewed              Wt Readings from Last 6 Encounters:   05/06/25 174 lb (78.9 kg)   08/20/24 185 lb (83.9 kg)   04/03/24 203 lb (92.1 kg)   03/21/24 210 lb (95.3 kg)   10/09/23 220 lb (99.8 kg)   09/28/23 220 lb (99.8 kg)          REVIEW OF SYSTEMS  GENERAL: feels well otherwise, denied any fevers chills or night sweats   LUNGS: denies shortness of breath  CARDIOVASCULAR: denies chest pain  GI: denies abdominal pain  MUSCULOSKELETAL: denies back pain, joint pains   PSYCH: denies change in behavior or mood, denies feeling sad or depressed    EXAM  /80   Pulse 96   Resp 18   Ht 5' 4\" (1.626 m)   Wt 174 lb (78.9 kg)   LMP 04/20/2025 (Exact Date)   SpO2 99%   BMI 29.87 kg/m²       GENERAL: well developed, well nourished, in no apparent distress, A/O x3  SKIN: no rashes, no suspicious lesions  HEENT: atraumatic, normocephalic, OP-clear, PERRLA  NECK: supple, no adenopathy  LUNGS: CTA in all  fields, breathing non labored  CARDIO: RRR without murmur  GI: +BS, NT/ND, no masses or HSM  EXTREMITIES: no cyanosis, no clubbing, no edema    Lab Results   Component Value Date     (H) 10/05/2023    BUN 9 10/05/2023    CREATSERUM 0.76 10/05/2023    ANIONGAP 6 10/05/2023    CA 8.8 10/05/2023    OSMOCALC 285 10/05/2023    ALKPHO 84 10/05/2023    AST 17 10/05/2023    ALT 27 10/05/2023    BILT 0.4 10/05/2023    TP 7.5 10/05/2023    ALB 3.6 10/05/2023    GLOBULIN 3.9 10/05/2023     10/05/2023    K 4.4 10/05/2023     10/05/2023    CO2 24.0 10/05/2023     No results found for: \"EAG\", \"A1C\"  Lab Results   Component Value Date    CHOLEST 145 10/05/2023    TRIG 105 10/05/2023    HDL 51 10/05/2023    LDL 75 10/05/2023    VLDL 16 10/05/2023    NONHDLC 94 10/05/2023     Lab Results   Component Value Date    B12 329 10/05/2023     Lab Results   Component Value Date    VITD 21.7 (L) 10/05/2023       Medications Ordered Prior to Encounter[1]    ASSESSMENT/PLAN    ICD-10-CM    1. Therapeutic drug monitoring  Z51.81       2. Obesity (BMI 30-39.9)  E66.9           PLAN   Initial Weight Data and Goal Weight Loss:  Initial consult: # 203lbs on 4/2024  Weight Calculations  Initial Weight: 203 lbs  Initial Weight Date: 04/03/24  Today's Weight: 174 lbs  5% Goal: 10.15  10% Goal: 20.3  Total Weight Loss: 29 lbs  Total weight loss: up #5 lbs total, Net loss 29 lbs  Will increase medications: phentermine 30mg  Mentioned possibly trying to add in Topamax, will wait on this  --advised of side effects and adverse effects of this medication  Contradictions: none  Reviewed labs   Continue with vitamin d OTC  Continue with vitamin b12 OTC  Wrote out new macros and encouraged tracking food   Nutrition: Low carb diet, recommended to eat breakfast daily/ regular protein intake  Follow up with dietitian and psychologist as recommended.  Discussed the role of sleep and stress in weight management.  Counseled on comprehensive weight  loss plan including attention to nutrition, exercise and behavior/stress management for success. See patient instruction below for more details.  Discussed strategies to overcome barriers to successful weight loss and weight maintenance  FITTE: ACSM recommendations (150-300 minutes/ week in active weight loss)   Weight Loss Consent to treat reviewed and signed.    Total time spent on chart review, pre-charting, obtaining history, counseling, and educating, reviewing labs was 30 minutes.       NOTE TO PATIENT: The 21st Century Cures Act makes clinical notes like these available to patients in the interest of transparency. Clinical notes are medical documents used by physicians and care providers to communicate with each other. These documents include medical language and terminology, abbreviations, and treatment information that may sound technical and at times possibly unfamiliar. In addition, at times, the verbiage may appear blunt or direct. These documents are one tool providers use to communicate relevant information and clinical opinions of the care providers in a way that allows common understanding of the clinical context.     There are no Patient Instructions on file for this visit.    No follow-ups on file.    Patient verbalizes understanding.    MALIK Michaud             [1]   Current Outpatient Medications on File Prior to Visit   Medication Sig Dispense Refill    Phentermine HCl 15 MG Oral Cap Take 1 capsule (15 mg total) by mouth every morning. 90 capsule 1     No current facility-administered medications on file prior to visit.

## 2025-05-06 NOTE — PATIENT INSTRUCTIONS
Next steps:  1.  Fill your prescribed medication and take as discussed and prescribed: phentermine 30mg  2.  Schedule a personal nutrition consultation with one of our registered dieticians     Please try to work on the following dietary changes:  Daily protein recommendation to start:  grams  Daily carbohydrate: <110g  Daily calories: 1,300  1.  Drink water with meals and throughout the day, cut down on soda and/or juice if consumed. Consider flavored water options like Bubbly, Spindrift, Hint and Gabe.  2.  Eat breakfast daily and focus on having protein with each meal, examples include: greek yogurt, cottage cheese, hard boiled egg, whole grain toast with peanut butter.   3.  Reduce refined carbohydrates and sugars which includes items such as sweets, as well as rice, pasta, and bread and make sure to choose whole grain options when having them with just 1 serving per meal about the size of your inner palm.  4.  Consume non starchy veggies daily working towards making them a good 50% of your daily food intake. Add them to lunch and dinner consistently.  5.  Start a daily probiotic: VSL#3 is recommended, (order on line at www.vsl3.com). Take 1 capsule daily with water for 30 days, then reduce to 1 every other day (this will reduce the cost). Capsules can be left out for 2 weeks, but then must be refrigerated.      Please download nadeen My Fitness Pal, LoseIt! Or Net Diary to monitor daily dietary intake and you will be able to see if you are eating the right amount of calories or too much or too little which would hinder weight loss. Additionally this will help to see your daily carbohydrate and protein intake. When you set the nadeen up choose 1-2 lbs/week as a goal.  Keeping a paper food journal is an option as well to remain accountable for your choices- this is the start to mindful eating! A low calorie diet has been consistently shown to support weight loss.     Continue or start exercising to help  establish a routine. If not already exercising begin with 1 day and progress as able with long-term goal of 30 minutes 5 days a week at a minimum.     Meditation daily can help manage and control stress. Chronic stress can make weight loss difficult.  Exercising is one way to help with stress, but meditation using the CALM Fransisco or another comparable alternative can be done in your home or place of work with little time commitment. This Fransisco can also help work on behavior change and improve sleep. Check out the segment under Calm Masterclass and listen to The 4 Pillars of Health. A great way to begin learning about the foundation of lifestyle with practical tips to use in your every day.     Check out www.yourweightmatters.org blog for continued daily support and education along this weight loss journey!    Patient Resources:     Personal Training/Fitness Classes/Health Coaching     [facility] @ [email or web address] Full fitness center with group fitness and personal training. Discount available as client of [facility].  Health Coaching and Personal Training with Jocelyne Estes at our [facility]- individual weekly coaching with option to add personal training and small group fitness classes targeted at weight loss- [phone] and/or email @ [email or web address]  360FIT [place] [email or web address] Group Fitness [phone] and/or email Joan at [email or web address]  [facility] Fitness Centers with multiple locations: EnerTrac (www.MEDL Mobile), Eat The Pembe Panjur Fitness (www.Curbed.com.GroupFlier), Fit Body Bootcamp (www.[place] MailingbodybootDigiZmartp.GroupFlier), [employer] (www.ChanRx Corp.GroupFlier), The Exercise  (www.exercisecoach.GroupFlier)     Online Fitness  Fitness  on Utube  Fit in 10 DVD series- www.kipax76MXE.com  Sit and Be Fit - Chair exercise series Www.sitandbefit.org  Hip Hop  Fit with Gene Todd at www.hiphopfit.net     Apps for on the Go Fitness  [place] 7 Minute Workout (orange box with white 7) - free on the go HIIT training fransisco  Peloton Fransisco @ www.onepeloton.com     Nutrition Trackers and Tools  LoseIT! And My Fitness Pal apps and on line for tracking nutrition  NOOM - virtual health coaching  FitFoundation (healthy meals on the go) in [place] @ www.gqohpdtzsgvbq0y.LocoMotive Labs  Bistro MD @ Veristormbistromd.com and Kxythk78 (keto and low carb plans recommended) @ www.nqabnf44.com, Metabolic Meals @ www.MyMetabolicMeals.com - individual prepared meals to go  Gobble, Blue Apron, Home , Every Plate, Sunbasket- on line meal delivery programs for preparation at home  Meal Village in [place] for homemade meals to go @ www.mealvillage.LocoMotive Labs  Diet Doctor @ www.dietdoctor.LocoMotive Labs - low carb swaps  YuSurplex - meal prep and planning fransisco (www.yummly.com)     Stress Management/Behavior/Mindful Eating  CALM meditation fransisco (www.calm.com)  Headspace  Am I Hungry? Mindful eating virtual  fransisco  Www.yourweightmatters.org - Obesity Action Coalition sponsored Blog posts daily  Motivation fransisco (black box with white \")- daily supportive messages sent to your phone     Books/Video Education/Podcasts  Mindless Eating by Rui Stephens  Why We Get Sick by Garry Alegria (a book about insulin resistance)  Atomic Habits by Royce Myrick (a book about taking small steps to promote greater behavior change)   Can't Hurt Me by Flako Arias (a book exploring the power of discipline in achieving your goals)  The End of Dieting: How to Live for Life by Dr. Evelio Canada M.D. or listen to The [employer] Podcast Episode 63: Understanding \"Nutritarian\" Eating w/Dr. Evelio Canada  Your Body in Balance: The New Science of Food, Hormones, and Health by Dr. Enrique Smith  The Menopause Diet Plan by Aubrie Evans and Kim Nunes  The Complete Guide to fasting by Dr. Loera  Sugar, Salt & Fat by Rody Roca, Ph.D, R.D.  Weight Loss  Surgery Will Not Treat Food Addiction by Paige Zavala Ph.D  The Game Changers- wavecatchix Documentary on plant based nutrition  Fed Up - documentary about obesity (Free on Utube)  The Truth About Sugar - documentary on sugar (Free on Utube, https://youFayettechill Clothing Companyu.be/1Z3urakZN5b)  The Dr. Young T5 Wellness Plan by Dr. Joel Young MD  Fitlosophy Fitspiration - journal to better health (found at [employer] in fitness aisle)  What Happened to You?- a look at the impact trauma has on behavior written by Edgar Rhodes and Dr. Leonardo Mills  Whole Again by Colby Miranda - discovering your true self after trauma  Lavon Ramachandran talk on [employer], The Call to Courage  Podcasts: The Exam Room by the Physician's Committee, Nutrition Facts by Dr. Gates    We are here to support you with weight loss, but please remember that you still need your primary care provider for your routine health maintenance.

## (undated) DEVICE — CURVED, SMALL JAW, OPEN SEALER/DIVIDER: Brand: LIGASURE

## (undated) DEVICE — STAPLER SKIN INSORB 2030

## (undated) DEVICE — LARGE, DISPOSABLE ALEXIS O C-SECTION PROTECTOR - RETRACTOR: Brand: ALEXIS ® O C-SECTION PROTECTOR - RETRACTOR

## (undated) NOTE — LETTER
Svitlana Williamson [address]  [address] [facility]  Authorization for Surgical Operation and Procedure     Date:___________                                                                                                         Time:__________ 4.   Should the need arise during my operation or immediate post-operative period, I also consent to the administration of blood and/or blood products.   Further, I understand that despite careful testing and screening of blood or blood products by cyndy 8.   I recognize that in the event my procedure results in extended X-Ray/fluoroscopy time, I may develop a skin reaction. 9.  If I have a Do Not Attempt Resuscitation (DNAR) order in place, that status will be suspended while in the operating room, proc 1. IReji agree to be cared for by an anesthesiologist, who is specially trained to monitor me and give me medicine to put me to sleep or keep me comfortable during my procedure.     I understand that my anesthesiologist is not an employee or a 5. My doctor has explained to me other choices available to me for my care (alternatives).     6. Pregnant Patients (“epidural”):  I understand that the risks of having an epidural (medicine given into my back to help control pain during labor), include itc

## (undated) NOTE — LETTER
10/06/23    Patient: Ramez Lu  : 3/22/1990 Visit date: 10/6/2023    Dear  Brian Juarez MD    Thank you for referring Ramez Lu to my practice. Please find my assessment and plan below. Good morning. I saw Osiel Sees in my clinic today. 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 I do not see any evidence of a sebaceous cyst or central punctum consistent with a sebaceous cyst.  She has an active sinus on the left but no drainage currently. Since she has been having symptoms since July, we are planning to undergo excision of her active disease on the left side. If she has a good result and continues to have flares on the right, we will plan for excision on the right as well. Thank you once again and please let me know if you need anything.     Sincerely,       Ez Mahan MD